# Patient Record
Sex: FEMALE | Race: WHITE | NOT HISPANIC OR LATINO | Employment: UNEMPLOYED | URBAN - METROPOLITAN AREA
[De-identification: names, ages, dates, MRNs, and addresses within clinical notes are randomized per-mention and may not be internally consistent; named-entity substitution may affect disease eponyms.]

---

## 2019-09-07 ENCOUNTER — HOSPITAL ENCOUNTER (OUTPATIENT)
Dept: MEDSURG UNIT | Facility: HOSPITAL | Age: 49
End: 2019-09-09
Attending: INTERNAL MEDICINE | Admitting: INTERNAL MEDICINE

## 2019-09-07 LAB
ABS NEUT (OLG): 5.02 X10(3)/MCL (ref 2.1–9.2)
ANION GAP SERPL CALC-SCNC: 15 MMOL/L
BASOPHILS # BLD AUTO: 0.1 X10(3)/MCL (ref 0–0.2)
BASOPHILS NFR BLD AUTO: 1 %
BUN SERPL-MCNC: 9 MG/DL (ref 8–26)
CHLORIDE SERPL-SCNC: 102 MMOL/L (ref 98–109)
CK MB SERPL-MCNC: <0.5 NG/ML (ref 0.5–3.6)
CK MB SERPL-MCNC: <1 NG/ML (ref 0.5–3.6)
CK SERPL-CCNC: 86 UNIT/L (ref 26–192)
CK SERPL-CCNC: 93 UNIT/L (ref 26–192)
CREAT SERPL-MCNC: 0.9 MG/DL (ref 0.6–1.3)
EOSINOPHIL # BLD AUTO: 0.2 X10(3)/MCL (ref 0–0.9)
EOSINOPHIL NFR BLD AUTO: 2 %
ERYTHROCYTE [DISTWIDTH] IN BLOOD BY AUTOMATED COUNT: 11.9 % (ref 11.5–17)
GLUCOSE SERPL-MCNC: 126 MG/DL (ref 70–105)
HCT VFR BLD AUTO: 43.9 % (ref 37–47)
HCT VFR BLD CALC: 43 % (ref 38–51)
HGB BLD-MCNC: 14.6 MG/DL (ref 12–17)
HGB BLD-MCNC: 14.9 GM/DL (ref 12–16)
LYMPHOCYTES # BLD AUTO: 2.8 X10(3)/MCL (ref 0.6–4.6)
LYMPHOCYTES NFR BLD AUTO: 32 %
MCH RBC QN AUTO: 30.2 PG (ref 27–31)
MCHC RBC AUTO-ENTMCNC: 33.9 GM/DL (ref 33–36)
MCV RBC AUTO: 89 FL (ref 80–94)
MONOCYTES # BLD AUTO: 0.7 X10(3)/MCL (ref 0.1–1.3)
MONOCYTES NFR BLD AUTO: 8 %
NEUTROPHILS # BLD AUTO: 5.02 X10(3)/MCL (ref 2.1–9.2)
NEUTROPHILS NFR BLD AUTO: 57 %
PLATELET # BLD AUTO: 248 X10(3)/MCL (ref 130–400)
PMV BLD AUTO: 11.2 FL (ref 9.4–12.4)
POC IONIZED CALCIUM: 1.06 MMOL/L (ref 1.12–1.32)
POC TCO2: 25 MMOL/L (ref 24–29)
POC TROPONIN: 0 NG/ML (ref 0–0.08)
POTASSIUM BLD-SCNC: 3.5 MMOL/L (ref 3.5–4.9)
RBC # BLD AUTO: 4.93 X10(6)/MCL (ref 4.2–5.4)
SODIUM BLD-SCNC: 138 MMOL/L (ref 138–146)
TROPONIN I SERPL-MCNC: 0.03 NG/ML (ref 0.02–0.49)
TROPONIN I SERPL-MCNC: <0.02 NG/ML (ref 0.02–0.49)
TROPONIN I SERPL-MCNC: <0.02 NG/ML (ref 0.02–0.49)
WBC # SPEC AUTO: 8.8 X10(3)/MCL (ref 4.5–11.5)

## 2019-09-08 LAB
ABS NEUT (OLG): 5.23 X10(3)/MCL (ref 2.1–9.2)
ALBUMIN SERPL-MCNC: 3.9 GM/DL (ref 3.4–5)
ALBUMIN/GLOB SERPL: 1.5 {RATIO}
ALP SERPL-CCNC: 74 UNIT/L (ref 38–126)
ALT SERPL-CCNC: 32 UNIT/L (ref 12–78)
AST SERPL-CCNC: 21 UNIT/L (ref 15–37)
BASOPHILS # BLD AUTO: 0 X10(3)/MCL (ref 0–0.2)
BASOPHILS NFR BLD AUTO: 1 %
BILIRUB SERPL-MCNC: 0.5 MG/DL (ref 0.2–1)
BILIRUBIN DIRECT+TOT PNL SERPL-MCNC: 0.1 MG/DL (ref 0–0.2)
BILIRUBIN DIRECT+TOT PNL SERPL-MCNC: 0.4 MG/DL (ref 0–0.8)
BUN SERPL-MCNC: 9 MG/DL (ref 7–18)
CALCIUM SERPL-MCNC: 8.9 MG/DL (ref 8.5–10.1)
CHLORIDE SERPL-SCNC: 105 MMOL/L (ref 98–107)
CHOLEST SERPL-MCNC: 159 MG/DL (ref 0–200)
CHOLEST/HDLC SERPL: 3.5 {RATIO} (ref 0–4)
CO2 SERPL-SCNC: 28 MMOL/L (ref 21–32)
CREAT SERPL-MCNC: 0.87 MG/DL (ref 0.55–1.02)
EOSINOPHIL # BLD AUTO: 0.1 X10(3)/MCL (ref 0–0.9)
EOSINOPHIL NFR BLD AUTO: 1 %
ERYTHROCYTE [DISTWIDTH] IN BLOOD BY AUTOMATED COUNT: 11.9 % (ref 11.5–17)
EST. AVERAGE GLUCOSE BLD GHB EST-MCNC: 126 MG/DL
GLOBULIN SER-MCNC: 2.6 GM/DL (ref 2.4–3.5)
GLUCOSE SERPL-MCNC: 98 MG/DL (ref 74–106)
HBA1C MFR BLD: 6 % (ref 4.2–6.3)
HCT VFR BLD AUTO: 39.3 % (ref 37–47)
HDLC SERPL-MCNC: 45 MG/DL (ref 35–60)
HGB BLD-MCNC: 13.2 GM/DL (ref 12–16)
LDLC SERPL CALC-MCNC: 88 MG/DL (ref 0–129)
LYMPHOCYTES # BLD AUTO: 1.9 X10(3)/MCL (ref 0.6–4.6)
LYMPHOCYTES NFR BLD AUTO: 24 %
MAGNESIUM SERPL-MCNC: 2 MG/DL (ref 1.8–2.4)
MCH RBC QN AUTO: 30.6 PG (ref 27–31)
MCHC RBC AUTO-ENTMCNC: 33.6 GM/DL (ref 33–36)
MCV RBC AUTO: 91 FL (ref 80–94)
MONOCYTES # BLD AUTO: 0.6 X10(3)/MCL (ref 0.1–1.3)
MONOCYTES NFR BLD AUTO: 8 %
NEUTROPHILS # BLD AUTO: 5.23 X10(3)/MCL (ref 2.1–9.2)
NEUTROPHILS NFR BLD AUTO: 66 %
PLATELET # BLD AUTO: 217 X10(3)/MCL (ref 130–400)
PMV BLD AUTO: 11.2 FL (ref 9.4–12.4)
POTASSIUM SERPL-SCNC: 3.7 MMOL/L (ref 3.5–5.1)
PROT SERPL-MCNC: 6.5 GM/DL (ref 6.4–8.2)
RBC # BLD AUTO: 4.32 X10(6)/MCL (ref 4.2–5.4)
SODIUM SERPL-SCNC: 140 MMOL/L (ref 136–145)
TRIGL SERPL-MCNC: 131 MG/DL (ref 30–150)
TROPONIN I SERPL-MCNC: <0.02 NG/ML (ref 0.02–0.49)
VLDLC SERPL CALC-MCNC: 26 MG/DL
WBC # SPEC AUTO: 7.9 X10(3)/MCL (ref 4.5–11.5)

## 2019-09-09 LAB
ABS NEUT (OLG): 4.79 X10(3)/MCL (ref 2.1–9.2)
ALBUMIN SERPL-MCNC: 4 GM/DL (ref 3.4–5)
ALBUMIN/GLOB SERPL: 1.3 RATIO (ref 1.1–2)
ALP SERPL-CCNC: 77 UNIT/L (ref 38–126)
ALT SERPL-CCNC: 27 UNIT/L (ref 12–78)
AST SERPL-CCNC: 20 UNIT/L (ref 15–37)
BASOPHILS # BLD AUTO: 0.1 X10(3)/MCL (ref 0–0.2)
BASOPHILS NFR BLD AUTO: 1 %
BILIRUB SERPL-MCNC: 0.5 MG/DL (ref 0.2–1)
BILIRUBIN DIRECT+TOT PNL SERPL-MCNC: 0.1 MG/DL (ref 0–0.5)
BILIRUBIN DIRECT+TOT PNL SERPL-MCNC: 0.4 MG/DL (ref 0–0.8)
BUN SERPL-MCNC: 7 MG/DL (ref 7–18)
CALCIUM SERPL-MCNC: 9.5 MG/DL (ref 8.5–10.1)
CHLORIDE SERPL-SCNC: 104 MMOL/L (ref 98–107)
CO2 SERPL-SCNC: 26 MMOL/L (ref 21–32)
CREAT SERPL-MCNC: 0.74 MG/DL (ref 0.55–1.02)
EOSINOPHIL # BLD AUTO: 0.2 X10(3)/MCL (ref 0–0.9)
EOSINOPHIL NFR BLD AUTO: 2 %
ERYTHROCYTE [DISTWIDTH] IN BLOOD BY AUTOMATED COUNT: 12 % (ref 11.5–17)
GLOBULIN SER-MCNC: 3 GM/DL (ref 2.4–3.5)
GLUCOSE SERPL-MCNC: 107 MG/DL (ref 74–106)
HCT VFR BLD AUTO: 41.7 % (ref 37–47)
HGB BLD-MCNC: 14 GM/DL (ref 12–16)
LYMPHOCYTES # BLD AUTO: 2.5 X10(3)/MCL (ref 0.6–4.6)
LYMPHOCYTES NFR BLD AUTO: 31 %
MAGNESIUM SERPL-MCNC: 2.1 MG/DL (ref 1.8–2.4)
MCH RBC QN AUTO: 30 PG (ref 27–31)
MCHC RBC AUTO-ENTMCNC: 33.6 GM/DL (ref 33–36)
MCV RBC AUTO: 89.3 FL (ref 80–94)
MONOCYTES # BLD AUTO: 0.6 X10(3)/MCL (ref 0.1–1.3)
MONOCYTES NFR BLD AUTO: 8 %
NEUTROPHILS # BLD AUTO: 4.79 X10(3)/MCL (ref 2.1–9.2)
NEUTROPHILS NFR BLD AUTO: 58 %
PLATELET # BLD AUTO: 227 X10(3)/MCL (ref 130–400)
PMV BLD AUTO: 11.6 FL (ref 9.4–12.4)
POTASSIUM SERPL-SCNC: 3.4 MMOL/L (ref 3.5–5.1)
PROT SERPL-MCNC: 7 GM/DL (ref 6.4–8.2)
RBC # BLD AUTO: 4.67 X10(6)/MCL (ref 4.2–5.4)
SODIUM SERPL-SCNC: 142 MMOL/L (ref 136–145)
WBC # SPEC AUTO: 8.2 X10(3)/MCL (ref 4.5–11.5)

## 2020-02-05 RX ORDER — NITROFURANTOIN 25; 75 MG/1; MG/1
CAPSULE ORAL
Qty: 30 CAPSULE | Refills: 6 | OUTPATIENT
Start: 2020-02-05

## 2022-03-08 ENCOUNTER — HISTORICAL (OUTPATIENT)
Dept: ADMINISTRATIVE | Facility: HOSPITAL | Age: 52
End: 2022-03-08

## 2022-03-08 LAB
FT4I SERPL CALC-MCNC: 2.44 (ref 2.6–3.6)
T3RU NFR SERPL: 33.16 % (ref 31–39)
T4 SERPL-MCNC: 7.36 UG/DL (ref 4.87–11.72)
TSH SERPL-ACNC: 2.39 M[IU]/L (ref 0.35–4.94)

## 2022-04-10 ENCOUNTER — HISTORICAL (OUTPATIENT)
Dept: ADMINISTRATIVE | Facility: HOSPITAL | Age: 52
End: 2022-04-10
Payer: MEDICAID

## 2022-04-26 VITALS
HEIGHT: 62 IN | SYSTOLIC BLOOD PRESSURE: 110 MMHG | WEIGHT: 214.31 LBS | BODY MASS INDEX: 39.44 KG/M2 | DIASTOLIC BLOOD PRESSURE: 82 MMHG

## 2022-04-30 NOTE — ED PROVIDER NOTES
Patient:   Sugar Johns            MRN: [id number]            FIN: [id number]               Age:   49 years     Sex:  Female     :  1970   Associated Diagnoses:   Chest pain   Author:   Gucci Baldwin MD      Basic Information   Additional information: Chief Complaint from Nursing Triage Note : Chief Complaint   2019 6:48 CDT        Chief Complaint           C/O constant midline chest heaviness w/ intermittent sharp midline chest pain, worse w/ deep breaths that woke her from sleep. Hx HTN, HLD, and anxiety. Also reports SOB, weakness, and dizziness. Denies N/V.  .      History of Present Illness   The patient presents with Patient is a 49-year-old who reports that she was awoken from sleep by substernal chest pain that was associated with nausea and diaphoresis as well as dizziness. Patient reports she has had these intermittent chest pain over the last several months but never with this severity. .  The onset was just prior to arrival.  The course/duration of symptoms is fluctuating in intensity.  Location: substernal. The character of symptoms is heaviness.  The degree at onset was moderate.  The degree at maximum was severe.  The degree at present is moderate.  The exacerbating factor is breathing.  The relieving factor is none.  Risk factors consist of hypertension and HL.  Associated symptoms: diaphoresis, palpitations and Dizziness.        Review of Systems   Constitutional symptoms:  No fever, no chills, no sweats, no weakness, no fatigue.    Skin symptoms:  No rash, no lesion.    Respiratory symptoms:  No shortness of breath, no cough, no hemoptysis.    Cardiovascular symptoms:  see above.   Gastrointestinal symptoms:  No abdominal pain, no nausea, no vomiting, no diarrhea.    Genitourinary symptoms:  No dysuria, no hematuria, no vaginal bleeding, no vaginal discharge.    Musculoskeletal symptoms:  No back pain,    Neurologic symptoms:  No headache, no dizziness, no  altered level of consciousness, no numbness, no tingling, no weakness.              Additional review of systems information: All other systems reviewed and otherwise negative.      Health Status   Allergies:    Allergic Reactions (Selected)  No Known Allergies.   Medications:  (Selected)   Inpatient Medications  Ordered  nitroglycerin 0.4 mg sublingual TAB: 0.4 mg, form: Tab-SL, SL, q5min PRN for chest pain, first dose 09/07/19 7:22:00 CDT, STAT  Prescriptions  Prescribed  propranolol 40 mg oral tablet: See Instructions, TAKE 1.5 TAB(S) BY MOUTH TWICE A DAY, # 270 tab(s), 3 Refill(s), eRx: [facility]/pharmacy #[id number]  tiZANidine 2 mg oral tablet: 2 mg = 1 tab(s), Oral, q8hr, PRN PRN as needed for muscle spasm, # 45 tab(s), 5 Refill(s), Pharmacy: [facility]/pharmacy #[id number]  Documented Medications  Documented  Crestor 20 mg oral tablet: 0 Refill(s)  Trintellix 20 mg oral tablet: 20 mg = 1 tab(s), Oral, Daily, # 30 tab(s), 0 Refill(s)  Xanax XR 1 mg oral tablet, extended release: See Instructions, 1 tab(s) Oral qAM as needed, 0 Refill(s)  aspirin 325 mg oral tablet: Daily, 0 Refill(s).      Past Medical/ Family/ Social History   Medical history:    No active or resolved past medical history items have been selected or recorded., HTN, Hyperlipidemia.   Surgical history:    No active procedure history items have been selected or recorded..   Family history:    No family history items have been selected or recorded., Family , h/o CAD.   Social history:    Social & Psychosocial Habits    Tobacco  09/07/2019  Use: Never (less than 100 in l    Patient Wants Consult For Cessation Counseling N/A    Abuse/Neglect  09/07/2019  SHX Any signs of abuse or neglect No  , Alcohol use: no history of alcohol abuse, Tobacco use: Denies, Drug use: Denies.   Problem list:    Active Problems (2)  Chronic pain   Obesity   .      Physical Examination               Vital Signs   Vital Signs   9/7/2019 7:15 CDT        Peripheral Pulse Rate     92 bpm                              Heart Rate Monitored      91 bpm                             Respiratory Rate          14 br/min                             SpO2                      96 %                             Oxygen Therapy            Room air                             Systolic Blood Pressure   137 mmHg                             Diastolic Blood Pressure  92 mmHg  HI                             Mean Arterial Pressure, Cuff              107 mmHg    9/7/2019 6:48 CDT        Temperature Oral          36.3 DegC                             Temperature Oral (calculated)             97.34 DegF                             Peripheral Pulse Rate     96 bpm                             Respiratory Rate          22 br/min                             SpO2                      99 %                             Oxygen Therapy            Room air                             Systolic Blood Pressure   158 mmHg  HI                             Diastolic Blood Pressure  106 mmHg  HI  .      Vital Signs (last 24 hrs)_____  Last Charted___________  Temp Oral     36.3 DegC  (SEP 07 06:48)  Heart Rate Peripheral   92 bpm  (SEP 07 07:15)  Resp Rate         14 br/min  (SEP 07 07:15)  SBP      137 mmHg  (SEP 07 07:15)  DBP      H 92mmHg  (SEP 07 07:15)  SpO2      96 %  (SEP 07 07:15)  .   Measurements   9/7/2019 6:48 CDT        Weight Dosing             88.5 kg                             Weight Measured and Calculated in Lbs     195.11 lb                             Weight Estimated          88.5 kg                             Height/Length Dosing      157.48 cm                             Height/Length Estimated   157.48 cm                             Body Mass Index Estimated 35.69 kg/m2  .               Per nurse's notes.   Basic Oxygen Information   9/7/2019 7:15 CDT        SpO2                      96 %                             Oxygen Therapy            Room air    9/7/2019 6:48 CDT        SpO2                      99 %                              Oxygen Therapy            Room air  .   General:  No acute distress.   Skin:  Warm, dry, no rash.    Eye:  Pupils are equal, round and reactive to light, normal conjunctiva.    Neck:  Supple.   Cardiovascular:  Regular rate and rhythm, No murmur, Normal peripheral perfusion, No edema.    Respiratory:  Breath sounds are equal, Symmetrical chest wall expansion, Respirations: Regular, Breath sounds: Clear.    Chest wall:  No tenderness, No deformity.    Back:  Nontender, Normal range of motion.    Musculoskeletal:  No swelling, no deformity.    Gastrointestinal:  Soft, Nontender, Non distended, Normal bowel sounds.    Neurological:  Alert and oriented to person, place, time, and situation, No focal neurological deficit observed, CN II-XII intact, normal sensory observed, normal motor observed, normal speech observed, normal coordination observed.    Psychiatric:  Cooperative, appropriate mood & affect.       Medical Decision Making   Differential Diagnosis:  Unstable angina, angina, anxiety, pulmonary embolism, atypical chest pain, pneumonia, gastroesophageal reflux disease, costochondritis, pleurisy, chest wall pain, bronchitis.    Electrocardiogram:  Rate 97, normal sinus rhythm, normal MI & QRS intervals, EP Interp, normal axis, diffuse Twave flattening, No ST changes.    Cardiac monitor:  Normal sinus rhythm.   Results review:  Reviewed Results: Lab results : Lab View(Date Range: 9/6/2019 0:00 CDT - 9/7/2019 9:05 CDT), Lab results : Lab View   9/7/2019 7:20 CDT        POC Sodium                138 mmol/L                             POC Potassium             3.5 mmol/L                             POC Chloride              102 mmol/L                             POC Ion Calcium           1.06 mmol/L  LOW                             POC Glucose               126 mg/dL  HI                             POC BUN                   9.0 mg/dL                             POC Creatinine            0.9 mg/dL                              POC AGAP                  15.0  NA                             POC Hb                    14.6 mg/dL                             POC Hct                   43.0 %                             POC TCO2                  25.0 mmol/L    9/7/2019 7:17 CDT        POC Troponin              0.00 ng/mL    9/7/2019 7:13 CDT        Troponin-I                <0.02 ng/mL                             WBC                       8.8 x10(3)/mcL                             RBC                       4.93 x10(6)/mcL                             Hgb                       14.9 gm/dL                             Hct                       43.9 %                             Platelet                  248 x10(3)/mcL                             MCV                       89.0 fL                             MCH                       30.2 pg                             MCHC                      33.9 gm/dL                             RDW                       11.9 %                             MPV                       11.2 fL                             Abs Neut                  5.02 x10(3)/mcL                             Neutro Auto               57 %  NA                             Lymph Auto                32 %  NA                             Mono Auto                 8 %  NA                             Eos Auto                  2 %  NA                             Abs Eos                   0.2 x10(3)/mcL                             Basophil Auto             1 %  NA                             Abs Neutro                5.02 x10(3)/mcL                             Abs Lymph                 2.8 x10(3)/mcL                             Abs Mono                  0.7 x10(3)/mcL                             Abs Baso                  0.1 x10(3)/mcL  .    Radiology results:  Rad Results (ST)  < 12 hrs   Accession: [id number]  Order: XR Chest 1 View  Report Dt/Tm: 09/07/2019 08:18  Report:   PORTABLE CHEST X-RAY, ONE FRONTAL VIEW     HISTORY: Chest  Pain     COMPARISON: None.     FINDINGS:       No focal consolidations, pleural effusions or pneumothoraces.  Cardiomediastinal silhouette within normal limits.  No acute bony pathology.  Soft tissues within normal limits.       IMPRESSION:     No acute thoracic abnormality.           .      Reexamination/ Reevaluation   Vital signs   results included from flowsheet : Vital Signs   9/7/2019 7:38 CDT        Systolic Blood Pressure   125 mmHg                             Diastolic Blood Pressure  93 mmHg  HI    9/7/2019 7:35 CDT        Peripheral Pulse Rate     96 bpm                             Heart Rate Monitored      97 bpm                             Respiratory Rate          17 br/min                             SpO2                      95 %                             Systolic Blood Pressure   139 mmHg                             Diastolic Blood Pressure  97 mmHg  HI                             Mean Arterial Pressure, Cuff              111 mmHg    9/7/2019 7:33 CDT        Systolic Blood Pressure   139 mmHg                             Diastolic Blood Pressure  97 mmHg  HI    9/7/2019 7:28 CDT        Systolic Blood Pressure   137 mmHg                             Diastolic Blood Pressure  92 mmHg  HI    9/7/2019 7:15 CDT        Peripheral Pulse Rate     92 bpm                             Heart Rate Monitored      91 bpm                             Respiratory Rate          14 br/min                             SpO2                      96 %                             Oxygen Therapy            Room air                             Systolic Blood Pressure   137 mmHg                             Diastolic Blood Pressure  92 mmHg  HI                             Mean Arterial Pressure, Cuff              107 mmHg    9/7/2019 6:48 CDT        Temperature Oral          36.3 DegC                             Temperature Oral (calculated)             97.34 DegF                             Peripheral Pulse Rate     96 bpm                              Respiratory Rate          22 br/min                             SpO2                      99 %                             Oxygen Therapy            Room air                             Systolic Blood Pressure   158 mmHg  HI                             Diastolic Blood Pressure  106 mmHg  HI     Basic Oxygen Information   9/7/2019 7:15 CDT        SpO2                      96 %                             Oxygen Therapy            Room air    9/7/2019 6:48 CDT        SpO2                      99 %                             Oxygen Therapy            Room air     Notes:   Patient seen and examined. Treated with sublingual nitroglycerin with significant relief.   Labs and imaging reviewed as above. No acute lab abnormalities. CXR negative. Case was discussed with [facility] for further evaluation.  On re-evaluation, patient reported after SL nitroglycerin that she was having a bit of reflux, and was feeling anxious and having a headache.   Patient treated with xanax which she has been taking for past 30 years per report as well as, pepcid and tylenol. Patient admitted to telemetry.  Patient remained stable during ED evaluation.  .      Impression and Plan   Diagnosis   Chest pain ([id number] R07.9)   Plan   Condition: Stable.    Disposition: Micaela CHRISTY, Brady HERRERA    Notes: No scribe was involved in the documentation of this chart..

## 2022-06-21 ENCOUNTER — HOSPITAL ENCOUNTER (EMERGENCY)
Facility: HOSPITAL | Age: 52
Discharge: LEFT BEFORE DISCHARGE | End: 2022-06-21
Payer: MEDICAID

## 2022-06-21 VITALS
HEIGHT: 62 IN | SYSTOLIC BLOOD PRESSURE: 136 MMHG | DIASTOLIC BLOOD PRESSURE: 68 MMHG | OXYGEN SATURATION: 97 % | HEART RATE: 80 BPM | BODY MASS INDEX: 46.01 KG/M2 | WEIGHT: 250 LBS | RESPIRATION RATE: 16 BRPM | TEMPERATURE: 98 F

## 2022-06-21 DIAGNOSIS — R53.1 WEAKNESS: ICD-10-CM

## 2022-06-21 LAB
FLUAV AG UPPER RESP QL IA.RAPID: NOT DETECTED
FLUBV AG UPPER RESP QL IA.RAPID: NOT DETECTED
SARS-COV-2 RNA RESP QL NAA+PROBE: NOT DETECTED

## 2022-06-21 PROCEDURE — 99900041 HC LEFT WITHOUT BEING SEEN- EMERGENCY

## 2022-06-21 PROCEDURE — 93005 ELECTROCARDIOGRAM TRACING: CPT

## 2022-06-21 PROCEDURE — 87636 SARSCOV2 & INF A&B AMP PRB: CPT | Performed by: EMERGENCY MEDICINE

## 2022-07-05 ENCOUNTER — HOSPITAL ENCOUNTER (EMERGENCY)
Facility: HOSPITAL | Age: 52
Discharge: HOME | End: 2022-07-05
Attending: STUDENT IN AN ORGANIZED HEALTH CARE EDUCATION/TRAINING PROGRAM
Payer: COMMERCIAL

## 2022-07-05 VITALS
HEIGHT: 62 IN | WEIGHT: 234 LBS | BODY MASS INDEX: 43.06 KG/M2 | SYSTOLIC BLOOD PRESSURE: 115 MMHG | HEART RATE: 103 BPM | RESPIRATION RATE: 18 BRPM | TEMPERATURE: 98 F | OXYGEN SATURATION: 98 % | DIASTOLIC BLOOD PRESSURE: 85 MMHG

## 2022-07-05 DIAGNOSIS — K21.9 GASTROESOPHAGEAL REFLUX DISEASE, UNSPECIFIED WHETHER ESOPHAGITIS PRESENT: Primary | ICD-10-CM

## 2022-07-05 DIAGNOSIS — F41.9 ANXIETY: ICD-10-CM

## 2022-07-05 DIAGNOSIS — R07.9 CHEST PAIN: ICD-10-CM

## 2022-07-05 LAB
ALBUMIN SERPL-MCNC: 4.5 GM/DL (ref 3.5–5)
ALBUMIN/GLOB SERPL: 1.5 RATIO (ref 1.1–2)
ALP SERPL-CCNC: 68 UNIT/L (ref 40–150)
ALT SERPL-CCNC: 18 UNIT/L (ref 0–55)
AST SERPL-CCNC: 22 UNIT/L (ref 5–34)
BASOPHILS # BLD AUTO: 0.05 X10(3)/MCL (ref 0–0.2)
BASOPHILS NFR BLD AUTO: 0.6 %
BILIRUBIN DIRECT+TOT PNL SERPL-MCNC: 0.6 MG/DL
BUN SERPL-MCNC: 6.7 MG/DL (ref 9.8–20.1)
CALCIUM SERPL-MCNC: 10.1 MG/DL (ref 8.4–10.2)
CHLORIDE SERPL-SCNC: 88 MMOL/L (ref 98–107)
CO2 SERPL-SCNC: 27 MMOL/L (ref 22–29)
CREAT SERPL-MCNC: 0.74 MG/DL (ref 0.55–1.02)
EOSINOPHIL # BLD AUTO: 0.1 X10(3)/MCL (ref 0–0.9)
EOSINOPHIL NFR BLD AUTO: 1.3 %
ERYTHROCYTE [DISTWIDTH] IN BLOOD BY AUTOMATED COUNT: 13 % (ref 11.5–17)
GLOBULIN SER-MCNC: 3 GM/DL (ref 2.4–3.5)
GLUCOSE SERPL-MCNC: 116 MG/DL (ref 74–100)
HCT VFR BLD AUTO: 37.2 % (ref 37–47)
HGB BLD-MCNC: 13.7 GM/DL (ref 12–16)
IMM GRANULOCYTES # BLD AUTO: 0.02 X10(3)/MCL (ref 0–0.04)
IMM GRANULOCYTES NFR BLD AUTO: 0.3 %
LIPASE SERPL-CCNC: 20 U/L
LYMPHOCYTES # BLD AUTO: 1.72 X10(3)/MCL (ref 0.6–4.6)
LYMPHOCYTES NFR BLD AUTO: 21.6 %
MAGNESIUM SERPL-MCNC: 1.8 MG/DL (ref 1.6–2.6)
MCH RBC QN AUTO: 30.6 PG (ref 27–31)
MCHC RBC AUTO-ENTMCNC: 36.8 MG/DL (ref 33–36)
MCV RBC AUTO: 83.2 FL (ref 80–94)
MONOCYTES # BLD AUTO: 0.71 X10(3)/MCL (ref 0.1–1.3)
MONOCYTES NFR BLD AUTO: 8.9 %
NEUTROPHILS # BLD AUTO: 5.4 X10(3)/MCL (ref 2.1–9.2)
NEUTROPHILS NFR BLD AUTO: 67.3 %
NRBC BLD AUTO-RTO: 0 %
PLATELET # BLD AUTO: 220 X10(3)/MCL (ref 130–400)
PMV BLD AUTO: 10.8 FL (ref 7.4–10.4)
POTASSIUM SERPL-SCNC: 3.8 MMOL/L (ref 3.5–5.1)
PROT SERPL-MCNC: 7.5 GM/DL (ref 6.4–8.3)
RBC # BLD AUTO: 4.47 X10(6)/MCL (ref 4.2–5.4)
SODIUM SERPL-SCNC: 133 MMOL/L (ref 136–145)
TROPONIN I SERPL-MCNC: <0.01 NG/ML (ref 0–0.04)
TSH SERPL-ACNC: 2.2 UIU/ML (ref 0.35–4.94)
WBC # SPEC AUTO: 8 X10(3)/MCL (ref 4.5–11.5)

## 2022-07-05 PROCEDURE — 83690 ASSAY OF LIPASE: CPT | Performed by: STUDENT IN AN ORGANIZED HEALTH CARE EDUCATION/TRAINING PROGRAM

## 2022-07-05 PROCEDURE — 93005 ELECTROCARDIOGRAM TRACING: CPT

## 2022-07-05 PROCEDURE — 36415 COLL VENOUS BLD VENIPUNCTURE: CPT | Performed by: STUDENT IN AN ORGANIZED HEALTH CARE EDUCATION/TRAINING PROGRAM

## 2022-07-05 PROCEDURE — 84443 ASSAY THYROID STIM HORMONE: CPT | Performed by: STUDENT IN AN ORGANIZED HEALTH CARE EDUCATION/TRAINING PROGRAM

## 2022-07-05 PROCEDURE — 85025 COMPLETE CBC W/AUTO DIFF WBC: CPT | Performed by: STUDENT IN AN ORGANIZED HEALTH CARE EDUCATION/TRAINING PROGRAM

## 2022-07-05 PROCEDURE — 93010 EKG 12-LEAD: ICD-10-PCS | Mod: ,,, | Performed by: INTERNAL MEDICINE

## 2022-07-05 PROCEDURE — 84484 ASSAY OF TROPONIN QUANT: CPT | Performed by: STUDENT IN AN ORGANIZED HEALTH CARE EDUCATION/TRAINING PROGRAM

## 2022-07-05 PROCEDURE — 93010 ELECTROCARDIOGRAM REPORT: CPT | Mod: ,,, | Performed by: INTERNAL MEDICINE

## 2022-07-05 PROCEDURE — 83735 ASSAY OF MAGNESIUM: CPT | Performed by: STUDENT IN AN ORGANIZED HEALTH CARE EDUCATION/TRAINING PROGRAM

## 2022-07-05 PROCEDURE — 80053 COMPREHEN METABOLIC PANEL: CPT | Performed by: STUDENT IN AN ORGANIZED HEALTH CARE EDUCATION/TRAINING PROGRAM

## 2022-07-05 PROCEDURE — 25000003 PHARM REV CODE 250: Performed by: STUDENT IN AN ORGANIZED HEALTH CARE EDUCATION/TRAINING PROGRAM

## 2022-07-05 PROCEDURE — 99285 EMERGENCY DEPT VISIT HI MDM: CPT | Mod: 25

## 2022-07-05 RX ORDER — ALUMINUM HYDROXIDE, MAGNESIUM HYDROXIDE, AND SIMETHICONE 2400; 240; 2400 MG/30ML; MG/30ML; MG/30ML
5 SUSPENSION ORAL
Status: COMPLETED | OUTPATIENT
Start: 2022-07-05 | End: 2022-07-05

## 2022-07-05 RX ADMIN — ALUMINUM HYDROXIDE, MAGNESIUM HYDROXIDE, AND DIMETHICONE 5 ML: 400; 400; 40 SUSPENSION ORAL at 11:07

## 2022-07-05 NOTE — ED PROVIDER NOTES
Encounter Date: 7/5/2022       History     Chief Complaint   Patient presents with    General Illness     Pt has multiple unrelated complaints of Sweating, chest pain, fatigue, headaches, slow speech(due to a mouth guard in place and will be having dental procedure next Monday for extraction) hx of anxiety states that she took xanax last night and states it did not help      The history is provided by the patient and the spouse.       52-year-old female with a past medical history of hypertension, anxiety/depression, type 2 diabetes and GERD presents emergency department for a multitude of complaints.  Firstly, patient states that she has been very anxious secondary to an upcoming dental surgery which she states will take 8 hours to complete.  Patient states that her anxiety has been elevated with this upcoming in next few days.  She also states that she has been having some chest pain.  States it is in epigastric region.  Unsure if it is her chest or GERD.  States that has been ongoing for last 2 days.  Fluctuates in intensity.  States is a burning sensation.  She also feels more fatigued than normal and has intermittent headaches.  Patient states that she has been taking less of her Xanax because she does not want to be taking too much Xanax.  No fevers or chills.  No shortness of breath or cough.    Review of patient's allergies indicates:   Allergen Reactions    Hydrocodone     Meperidine      Other reaction(s): Panic attack    Ciprofloxacin Nausea Only    Gabapentin Anxiety    Sulfamethoxazole-trimethoprim Nausea Only     Past Medical History:   Diagnosis Date    Anxiety disorder, unspecified     Depression     Diabetes mellitus     GERD (gastroesophageal reflux disease)     Hypertension      Past Surgical History:   Procedure Laterality Date    HYSTERECTOMY       History reviewed. No pertinent family history.  Social History     Tobacco Use    Smoking status: Former Smoker     Quit date: 2020      Years since quittin.5    Smokeless tobacco: Never Used   Substance Use Topics    Drug use: Never     Review of Systems   Constitutional: Positive for fatigue. Negative for fever.   Respiratory: Negative for cough and shortness of breath.    Cardiovascular: Positive for chest pain. Negative for palpitations.   Gastrointestinal: Positive for abdominal pain (epigastric). Negative for constipation, diarrhea, nausea and vomiting.   Psychiatric/Behavioral: The patient is nervous/anxious.        Physical Exam     Initial Vitals [22 1100]   BP Pulse Resp Temp SpO2   124/89 82 16 97.5 °F (36.4 °C) 99 %      MAP       --         Physical Exam    Nursing note and vitals reviewed.  Constitutional: She appears well-developed and well-nourished. No distress.   Cardiovascular: Normal rate and regular rhythm.   Pulmonary/Chest: Breath sounds normal. No respiratory distress.   Abdominal: Abdomen is soft. Bowel sounds are normal. There is abdominal tenderness (mild epigastric pain-reproduces complaint). There is no rebound and no guarding.   Musculoskeletal:         General: No tenderness. Normal range of motion.     Neurological: She is alert and oriented to person, place, and time.   Skin: Skin is warm. Capillary refill takes less than 2 seconds.   Psychiatric: She has a normal mood and affect. Thought content normal.         ED Course   Procedures  Labs Reviewed   COMPREHENSIVE METABOLIC PANEL - Abnormal; Notable for the following components:       Result Value    Sodium Level 133 (*)     Chloride 88 (*)     Glucose Level 116 (*)     Blood Urea Nitrogen 6.7 (*)     All other components within normal limits   CBC WITH DIFFERENTIAL - Abnormal; Notable for the following components:    MCHC 36.8 (*)     MPV 10.8 (*)     All other components within normal limits   LIPASE - Normal   MAGNESIUM - Normal   TROPONIN I - Normal   TSH - Normal   CBC W/ AUTO DIFFERENTIAL    Narrative:     The following orders were created for panel  order CBC auto differential.  Procedure                               Abnormality         Status                     ---------                               -----------         ------                     CBC with Differential[[id number]]        Abnormal            Final result                 Please view results for these tests on the individual orders.     EKG Readings: (Independently Interpreted)   Rhythm: Normal Sinus Rhythm. Heart Rate: 89. Ectopy: No Ectopy. Conduction: Normal. ST Segments: Normal ST Segments. T Waves: Normal. Clinical Impression: Normal Sinus Rhythm     ECG Results          EKG 12-lead (In process)  Result time 07/05/22 13:11:03    In process by Interface, Lab In [facility] (07/05/22 13:11:03)                 Narrative:    Test Reason : R07.9,    Vent. Rate : 089 BPM     Atrial Rate : 089 BPM     P-R Int : 138 ms          QRS Dur : 094 ms      QT Int : 376 ms       P-R-T Axes : 057 050 -02 degrees     QTc Int : 457 ms    Normal sinus rhythm  Nonspecific T wave abnormality  Abnormal ECG  When compared with ECG of 21-JUN-2022 04:06,  No significant change was found    Referred By: [email or web address]   SELF           Confirmed By:                             Imaging Results          X-Ray Chest AP Portable (Final result)  Result time 07/05/22 11:45:57    Final result by Eleazar Spears MD (07/05/22 11:45:57)                 Impression:        No convincing acute radiographic abnormality.      Electronically signed by: Eleazar Spears  Date:    07/05/2022  Time:    11:45             Narrative:    EXAMINATION:  XR CHEST AP PORTABLE    CLINICAL HISTORY:  ; chest pain;    TECHNIQUE:  Single view (AP) of the chest.    COMPARISON:  7 September 2019    FINDINGS:  Lines/tubes/devices: none present    The cardiomediastinal silhouette and central pulmonary vasculature are unremarkable for utilized technique.  The trachea is midline. There is no lobar consolidation, pleural effusion, or pneumothorax.  Rounded opacity at  the left costophrenic angle is unchanged, consistent with benign etiology.    There is no acute osseous or extrathoracic abnormality.                                X-Rays:   Independently Interpreted Readings:   Other Readings:  No consolidations, pneumothorax subcutaneous emphysema    Medications   aluminum & magnesium hydroxide-simethicone 400-400-40 mg/5 mL suspension 5 mL (5 mLs Oral Given 7/5/22 1148)     Medical Decision Making:   Differential Diagnosis:   Anxiety, ACS, GERD, pneumothorax             ED Course as of 07/05/22 1330   Tue Jul 05, 2022   1327 Patient resting comfortably in bed no acute distress.  Vital signs are stable.  Patient states he feels better after Maalox.  No longer having any chest pain epigastric pain.  Informed patient to follow-up PCP to restart further anti acid medications.  Informed to take her Nexium as prescribed.  Strict ER precautions given and patient states understanding. [BS]      ED Course User Index  [BS] Jd Holt MD             Clinical Impression:   Final diagnoses:  [R07.9] Chest pain  [K21.9] Gastroesophageal reflux disease, unspecified whether esophagitis present (Primary)  [F41.9] Anxiety          ED Disposition Condition    Discharge Stable        ED Prescriptions     None        Follow-up Information     Follow up With Specialties Details Why Contact Info    Roberto Araujo MD Internal Medicine Schedule an appointment as soon as possible for a visit   [address] [facility].  [facility] [address]  [phone]      [facility] - Emergency Dept Emergency Medicine Go to  If symptoms worsen [address]  [facility] [id number] [phone]           Jd Holt MD  07/05/22 133

## 2023-05-23 ENCOUNTER — HOSPITAL ENCOUNTER (OUTPATIENT)
Facility: HOSPITAL | Age: 53
Discharge: HOME | End: 2023-05-28
Attending: EMERGENCY MEDICINE | Admitting: INTERNAL MEDICINE
Payer: COMMERCIAL

## 2023-05-23 DIAGNOSIS — R07.9 CHEST PAIN: ICD-10-CM

## 2023-05-23 DIAGNOSIS — R42 POSTURAL DIZZINESS WITH NEAR SYNCOPE: ICD-10-CM

## 2023-05-23 DIAGNOSIS — R42 DIZZINESS: ICD-10-CM

## 2023-05-23 DIAGNOSIS — R55 POSTURAL DIZZINESS WITH NEAR SYNCOPE: ICD-10-CM

## 2023-05-23 DIAGNOSIS — I82.409 DVT (DEEP VENOUS THROMBOSIS): ICD-10-CM

## 2023-05-23 LAB
ALBUMIN SERPL-MCNC: 4 G/DL (ref 3.5–5)
ALBUMIN/GLOB SERPL: 1.3 RATIO (ref 1.1–2)
ALP SERPL-CCNC: 47 UNIT/L (ref 40–150)
ALT SERPL-CCNC: 25 UNIT/L (ref 0–55)
APPEARANCE UR: CLEAR
AST SERPL-CCNC: 24 UNIT/L (ref 5–34)
BACTERIA #/AREA URNS AUTO: ABNORMAL /HPF
BASOPHILS # BLD AUTO: 0.04 X10(3)/MCL
BASOPHILS NFR BLD AUTO: 0.5 %
BILIRUB UR QL STRIP.AUTO: NEGATIVE MG/DL
BILIRUBIN DIRECT+TOT PNL SERPL-MCNC: 0.8 MG/DL
BNP BLD-MCNC: 22.4 PG/ML
BUN SERPL-MCNC: 13.7 MG/DL (ref 9.8–20.1)
CALCIUM SERPL-MCNC: 10 MG/DL (ref 8.4–10.2)
CHLORIDE SERPL-SCNC: 103 MMOL/L (ref 98–107)
CO2 SERPL-SCNC: 21 MMOL/L (ref 22–29)
COLOR UR: YELLOW
CREAT SERPL-MCNC: 0.94 MG/DL (ref 0.55–1.02)
EOSINOPHIL # BLD AUTO: 0.03 X10(3)/MCL (ref 0–0.9)
EOSINOPHIL NFR BLD AUTO: 0.3 %
ERYTHROCYTE [DISTWIDTH] IN BLOOD BY AUTOMATED COUNT: 12 % (ref 11.5–17)
FLUAV AG UPPER RESP QL IA.RAPID: NOT DETECTED
FLUBV AG UPPER RESP QL IA.RAPID: NOT DETECTED
GFR SERPLBLD CREATININE-BSD FMLA CKD-EPI: >60 MLS/MIN/1.73/M2
GLOBULIN SER-MCNC: 3.1 GM/DL (ref 2.4–3.5)
GLUCOSE SERPL-MCNC: 115 MG/DL (ref 74–100)
GLUCOSE UR QL STRIP.AUTO: NEGATIVE MG/DL
HCT VFR BLD AUTO: 38 % (ref 37–47)
HGB BLD-MCNC: 13.2 G/DL (ref 12–16)
IMM GRANULOCYTES # BLD AUTO: 0.03 X10(3)/MCL (ref 0–0.04)
IMM GRANULOCYTES NFR BLD AUTO: 0.3 %
KETONES UR QL STRIP.AUTO: NEGATIVE MG/DL
LEUKOCYTE ESTERASE UR QL STRIP.AUTO: ABNORMAL UNIT/L
LYMPHOCYTES # BLD AUTO: 1.61 X10(3)/MCL (ref 0.6–4.6)
LYMPHOCYTES NFR BLD AUTO: 18.5 %
MAGNESIUM SERPL-MCNC: 1.8 MG/DL (ref 1.6–2.6)
MCH RBC QN AUTO: 31.3 PG (ref 27–31)
MCHC RBC AUTO-ENTMCNC: 34.7 G/DL (ref 33–36)
MCV RBC AUTO: 90 FL (ref 80–94)
MONOCYTES # BLD AUTO: 0.52 X10(3)/MCL (ref 0.1–1.3)
MONOCYTES NFR BLD AUTO: 6 %
NEUTROPHILS # BLD AUTO: 6.47 X10(3)/MCL (ref 2.1–9.2)
NEUTROPHILS NFR BLD AUTO: 74.4 %
NITRITE UR QL STRIP.AUTO: NEGATIVE
NRBC BLD AUTO-RTO: 0 %
PH UR STRIP.AUTO: 5.5 [PH]
PLATELET # BLD AUTO: 227 X10(3)/MCL (ref 130–400)
PMV BLD AUTO: 10.8 FL (ref 7.4–10.4)
POCT GLUCOSE: 93 MG/DL (ref 70–110)
POTASSIUM SERPL-SCNC: 4.3 MMOL/L (ref 3.5–5.1)
PROT SERPL-MCNC: 7.1 GM/DL (ref 6.4–8.3)
PROT UR QL STRIP.AUTO: NEGATIVE MG/DL
RBC # BLD AUTO: 4.22 X10(6)/MCL (ref 4.2–5.4)
RBC #/AREA URNS AUTO: ABNORMAL /HPF
RBC UR QL AUTO: NEGATIVE UNIT/L
SARS-COV-2 RNA RESP QL NAA+PROBE: NOT DETECTED
SODIUM SERPL-SCNC: 136 MMOL/L (ref 136–145)
SP GR UR STRIP.AUTO: 1.01
SQUAMOUS #/AREA URNS AUTO: ABNORMAL /HPF
TROPONIN I SERPL-MCNC: <0.01 NG/ML (ref 0–0.04)
TSH SERPL-ACNC: 2.43 UIU/ML (ref 0.35–4.94)
UROBILINOGEN UR STRIP-ACNC: 0.2 MG/DL
WBC # SPEC AUTO: 8.7 X10(3)/MCL (ref 4.5–11.5)
WBC #/AREA URNS AUTO: ABNORMAL /HPF

## 2023-05-23 PROCEDURE — G0378 HOSPITAL OBSERVATION PER HR: HCPCS

## 2023-05-23 PROCEDURE — 83880 ASSAY OF NATRIURETIC PEPTIDE: CPT | Performed by: EMERGENCY MEDICINE

## 2023-05-23 PROCEDURE — 25000003 PHARM REV CODE 250: Performed by: EMERGENCY MEDICINE

## 2023-05-23 PROCEDURE — 83735 ASSAY OF MAGNESIUM: CPT | Performed by: EMERGENCY MEDICINE

## 2023-05-23 PROCEDURE — 93005 ELECTROCARDIOGRAM TRACING: CPT

## 2023-05-23 PROCEDURE — 84484 ASSAY OF TROPONIN QUANT: CPT | Performed by: EMERGENCY MEDICINE

## 2023-05-23 PROCEDURE — 84443 ASSAY THYROID STIM HORMONE: CPT | Performed by: EMERGENCY MEDICINE

## 2023-05-23 PROCEDURE — 99285 EMERGENCY DEPT VISIT HI MDM: CPT | Mod: 25

## 2023-05-23 PROCEDURE — 85025 COMPLETE CBC W/AUTO DIFF WBC: CPT | Performed by: EMERGENCY MEDICINE

## 2023-05-23 PROCEDURE — 96375 TX/PRO/DX INJ NEW DRUG ADDON: CPT

## 2023-05-23 PROCEDURE — 21400001 HC TELEMETRY ROOM

## 2023-05-23 PROCEDURE — 80053 COMPREHEN METABOLIC PANEL: CPT | Performed by: EMERGENCY MEDICINE

## 2023-05-23 PROCEDURE — 0240U COVID/FLU A&B PCR: CPT | Performed by: EMERGENCY MEDICINE

## 2023-05-23 PROCEDURE — 81001 URINALYSIS AUTO W/SCOPE: CPT | Performed by: EMERGENCY MEDICINE

## 2023-05-23 PROCEDURE — 63600175 PHARM REV CODE 636 W HCPCS: Performed by: EMERGENCY MEDICINE

## 2023-05-23 PROCEDURE — 82962 GLUCOSE BLOOD TEST: CPT

## 2023-05-23 PROCEDURE — 25500020 PHARM REV CODE 255: Performed by: EMERGENCY MEDICINE

## 2023-05-23 PROCEDURE — 96361 HYDRATE IV INFUSION ADD-ON: CPT

## 2023-05-23 RX ORDER — SODIUM CHLORIDE 9 MG/ML
1000 INJECTION, SOLUTION INTRAVENOUS
Status: COMPLETED | OUTPATIENT
Start: 2023-05-23 | End: 2023-05-23

## 2023-05-23 RX ORDER — AMLODIPINE BESYLATE 2.5 MG/1
2.5 TABLET ORAL DAILY
Status: ON HOLD | COMMUNITY
Start: 2023-04-20 | End: 2023-05-28 | Stop reason: SDUPTHER

## 2023-05-23 RX ORDER — ACETAMINOPHEN 500 MG
1000 TABLET ORAL
Status: COMPLETED | OUTPATIENT
Start: 2023-05-23 | End: 2023-05-23

## 2023-05-23 RX ORDER — PROPRANOLOL HYDROCHLORIDE 60 MG/1
60 TABLET ORAL 2 TIMES DAILY
COMMUNITY
Start: 2023-02-22 | End: 2023-05-28

## 2023-05-23 RX ORDER — ACETAMINOPHEN 500 MG
TABLET ORAL
Status: DISPENSED
Start: 2023-05-23 | End: 2023-05-24

## 2023-05-23 RX ORDER — OXYCODONE HYDROCHLORIDE 5 MG/1
TABLET ORAL
Status: DISPENSED
Start: 2023-05-23 | End: 2023-05-24

## 2023-05-23 RX ORDER — ALPRAZOLAM 1 MG/1
1 TABLET ORAL 2 TIMES DAILY PRN
COMMUNITY
Start: 2023-05-04

## 2023-05-23 RX ORDER — LORAZEPAM 2 MG/ML
1 INJECTION INTRAMUSCULAR
Status: COMPLETED | OUTPATIENT
Start: 2023-05-23 | End: 2023-05-23

## 2023-05-23 RX ORDER — LEVOTHYROXINE SODIUM 50 UG/1
50 TABLET ORAL DAILY
COMMUNITY
Start: 2023-04-20

## 2023-05-23 RX ORDER — ROSUVASTATIN CALCIUM 20 MG/1
20 TABLET, COATED ORAL DAILY
COMMUNITY
Start: 2023-04-12

## 2023-05-23 RX ORDER — ONDANSETRON 2 MG/ML
4 INJECTION INTRAMUSCULAR; INTRAVENOUS
Status: COMPLETED | OUTPATIENT
Start: 2023-05-23 | End: 2023-05-23

## 2023-05-23 RX ORDER — FLUTICASONE PROPIONATE 50 MCG
1 SPRAY, SUSPENSION (ML) NASAL 2 TIMES DAILY
COMMUNITY
Start: 2023-05-22

## 2023-05-23 RX ORDER — VORTIOXETINE 20 MG/1
1 TABLET, FILM COATED ORAL DAILY
COMMUNITY
Start: 2023-03-07

## 2023-05-23 RX ORDER — OXYCODONE HYDROCHLORIDE 5 MG/1
5 TABLET ORAL
Status: COMPLETED | OUTPATIENT
Start: 2023-05-23 | End: 2023-05-23

## 2023-05-23 RX ORDER — MECLIZINE HYDROCHLORIDE 25 MG/1
TABLET ORAL
Status: DISPENSED
Start: 2023-05-23 | End: 2023-05-24

## 2023-05-23 RX ORDER — ESOMEPRAZOLE MAGNESIUM 40 MG/1
40 CAPSULE, DELAYED RELEASE ORAL DAILY
COMMUNITY
Start: 2023-04-30

## 2023-05-23 RX ORDER — METFORMIN HYDROCHLORIDE 500 MG/1
500 TABLET, EXTENDED RELEASE ORAL DAILY
COMMUNITY
Start: 2023-04-02

## 2023-05-23 RX ORDER — SPIRONOLACTONE 25 MG/1
25 TABLET ORAL DAILY
COMMUNITY
Start: 2023-03-12 | End: 2023-05-28

## 2023-05-23 RX ORDER — HYDROXYZINE HYDROCHLORIDE 50 MG/1
50-100 TABLET, FILM COATED ORAL NIGHTLY PRN
COMMUNITY
Start: 2023-05-04

## 2023-05-23 RX ORDER — OLMESARTAN MEDOXOMIL 40 MG/1
40 TABLET ORAL DAILY
COMMUNITY
Start: 2023-05-14

## 2023-05-23 RX ORDER — MECLIZINE HYDROCHLORIDE 25 MG/1
25 TABLET ORAL
Status: COMPLETED | OUTPATIENT
Start: 2023-05-23 | End: 2023-05-23

## 2023-05-23 RX ADMIN — SODIUM CHLORIDE, POTASSIUM CHLORIDE, SODIUM LACTATE AND CALCIUM CHLORIDE 1000 ML: 600; 310; 30; 20 INJECTION, SOLUTION INTRAVENOUS at 04:05

## 2023-05-23 RX ADMIN — SODIUM CHLORIDE 1000 ML: 9 INJECTION, SOLUTION INTRAVENOUS at 08:05

## 2023-05-23 RX ADMIN — OXYCODONE HYDROCHLORIDE 5 MG: 5 TABLET ORAL at 07:05

## 2023-05-23 RX ADMIN — MECLIZINE HYDROCHLORIDE 25 MG: 25 TABLET ORAL at 07:05

## 2023-05-23 RX ADMIN — SODIUM CHLORIDE 1000 ML: 9 INJECTION, SOLUTION INTRAVENOUS at 09:05

## 2023-05-23 RX ADMIN — LORAZEPAM 1 MG: 2 INJECTION INTRAMUSCULAR; INTRAVENOUS at 04:05

## 2023-05-23 RX ADMIN — ONDANSETRON 4 MG: 2 INJECTION INTRAMUSCULAR; INTRAVENOUS at 04:05

## 2023-05-23 RX ADMIN — ACETAMINOPHEN 1000 MG: 500 TABLET, FILM COATED ORAL at 07:05

## 2023-05-23 RX ADMIN — IOPAMIDOL 100 ML: 755 INJECTION, SOLUTION INTRAVENOUS at 08:05

## 2023-05-23 NOTE — ED PROVIDER NOTES
Encounter Date: 5/23/2023       History     Chief Complaint   Patient presents with    Weakness     Complains of weakness and dizziness     The history is provided by the patient and the EMS personnel. No  was used.   Illness   The current episode started today. The problem has been unchanged. Nothing relieves the symptoms. Nothing aggravates the symptoms. Associated symptoms include muscle aches. Pertinent negatives include no fever, no nausea, no sore throat, no shortness of breath and no rash.   Reports h/o vertigo but that this dizziness is much worse than her typical vertigo.  Unrelieved with meclizine.    Review of patient's allergies indicates:   Allergen Reactions    Hydrocodone     Meperidine      Other reaction(s): Panic attack    Ciprofloxacin Nausea Only    Gabapentin Anxiety    Sulfamethoxazole-trimethoprim Nausea Only     Past Medical History:   Diagnosis Date    Anxiety disorder, unspecified     Depression     Diabetes mellitus     GERD (gastroesophageal reflux disease)     Hypertension      Past Surgical History:   Procedure Laterality Date    HYSTERECTOMY       No family history on file.  Social History     Tobacco Use    Smoking status: Former     Types: Cigarettes     Quit date: 2020     Years since quitting: 3.3    Smokeless tobacco: Never   Substance Use Topics    Drug use: Never     Review of Systems   Constitutional:  Negative for fever.   HENT:  Negative for sore throat.    Respiratory:  Negative for shortness of breath.    Cardiovascular:  Negative for chest pain.   Gastrointestinal:  Negative for nausea.   Genitourinary:  Negative for dysuria.   Musculoskeletal:  Negative for back pain.   Skin:  Negative for rash.   Neurological:  Negative for weakness.   Hematological:  Does not bruise/bleed easily.     Physical Exam     Initial Vitals [05/23/23 1601]   BP Pulse Resp Temp SpO2   (!) 172/98 98 18 98.2 °F (36.8 °C) 98 %      MAP       --         Physical Exam    Nursing  note and vitals reviewed.  Constitutional: She appears well-developed and well-nourished.   HENT:   Head: Normocephalic and atraumatic.   Right Ear: External ear normal.   Left Ear: External ear normal.   Eyes: Conjunctivae and EOM are normal. Pupils are equal, round, and reactive to light.   Neck: Neck supple.   Normal range of motion.  Cardiovascular:  Normal rate, regular rhythm, normal heart sounds and intact distal pulses.           Pulmonary/Chest: Breath sounds normal.   Abdominal: Abdomen is soft. Bowel sounds are normal.   obese   Musculoskeletal:         General: Normal range of motion.      Cervical back: Normal range of motion and neck supple.     Neurological: She is alert and oriented to person, place, and time. GCS score is 15. GCS eye subscore is 4. GCS verbal subscore is 5. GCS motor subscore is 6.   Skin: Skin is warm and dry. Capillary refill takes less than 2 seconds.   Psychiatric: Her behavior is normal. Judgment and thought content normal. Her mood appears anxious.       ED Course   Procedures  Labs Reviewed   COMPREHENSIVE METABOLIC PANEL - Abnormal; Notable for the following components:       Result Value    Carbon Dioxide 21 (*)     Glucose Level 115 (*)     All other components within normal limits   URINALYSIS, REFLEX TO URINE CULTURE - Abnormal; Notable for the following components:    Leukocyte Esterase, UA Trace (*)     All other components within normal limits   CBC WITH DIFFERENTIAL - Abnormal; Notable for the following components:    MCH 31.3 (*)     MPV 10.8 (*)     All other components within normal limits   URINALYSIS, MICROSCOPIC - Abnormal; Notable for the following components:    Bacteria, UA Trace (*)     Squamous Epithelial Cells, UA Few (*)     All other components within normal limits   B-TYPE NATRIURETIC PEPTIDE - Normal   COVID/FLU A&B PCR - Normal    Narrative:     The Xpert Xpress SARS-CoV-2/FLU/RSV plus is a rapid, multiplexed real-time PCR test intended for the  simultaneous qualitative detection and differentiation of SARS-CoV-2, Influenza A, Influenza B, and respiratory syncytial virus (RSV) viral RNA in either nasopharyngeal swab or nasal swab specimens.         MAGNESIUM - Normal   TROPONIN I - Normal   TSH - Normal   CULTURE, URINE   CBC W/ AUTO DIFFERENTIAL    Narrative:     The following orders were created for panel order CBC auto differential.  Procedure                               Abnormality         Status                     ---------                               -----------         ------                     CBC with Differential[[id number]]        Abnormal            Final result                 Please view results for these tests on the individual orders.   HEMOGLOBIN A1C   POCT GLUCOSE, HAND-HELD DEVICE   POCT GLUCOSE   POCT GLUCOSE MONITORING CONTINUOUS     EKG Readings: (Independently Interpreted)   Initial Reading: No STEMI. Rhythm: Normal Sinus Rhythm. Heart Rate: 94. Ectopy: No Ectopy. Conduction: Normal. ST Segments: Normal ST Segments. T Waves: Normal. Axis: Normal. Clinical Impression: Normal Sinus Rhythm   ECG Results              EKG 12-lead (Final result)  Result time 05/24/23 09:31:01      Final result by Interface, Lab In [facility] (05/24/23 09:31:01)                   Narrative:    Test Reason : R07.9,    Vent. Rate : 102 BPM     Atrial Rate : 102 BPM     P-R Int : 132 ms          QRS Dur : 078 ms      QT Int : 342 ms       P-R-T Axes : 043 051 049 degrees     QTc Int : 445 ms    Sinus tachycardia  Otherwise normal ECG  When compared with ECG of 23-MAY-2023 16:38,  No significant change was found  Confirmed by Noman Landers MD ([id number]) on 5/24/2023 9:30:51 AM    Referred By: [email or web address]   SELF           Confirmed By:Noman Landers MD                                     EKG 12-lead (Final result)  Result time 05/24/23 09:33:02      Final result by Interface, Lab In [facility] (05/24/23 09:33:02)                   Narrative:    Test Reason :  R42,    Vent. Rate : 094 BPM     Atrial Rate : 094 BPM     P-R Int : 132 ms          QRS Dur : 080 ms      QT Int : 330 ms       P-R-T Axes : 045 050 042 degrees     QTc Int : 412 ms    Normal sinus rhythm  Normal ECG  When compared with ECG of 05-JUL-2022 11:12,  Nonspecific T wave abnormality, improved in Inferior leads  Nonspecific T wave abnormality no longer evident in Anterior-lateral leads  Confirmed by Noman Landers MD ([id number]) on 5/24/2023 9:32:50 AM    Referred By: [email or web address]   SELF           Confirmed By:Noman Landers MD                                  Imaging Results              CTA Chest Non-Coronary (PE Studies) (Final result)  Result time 05/23/23 20:12:04      Final result by Jose Torres MD (05/23/23 20:12:04)                   Impression:      NO PULMONARY EMBOLISM IDENTIFIED.      Electronically signed by: Jose Torres  Date:    05/23/2023  Time:    20:12               Narrative:    EXAMINATION:  CTA CHEST NON CORONARY (PE STUDIES)    CLINICAL HISTORY:  chest pain, vertigo, eval for dissection;    TECHNIQUE:  Sequential axial images performed of the chest using pulmonary embolism protocol following intravenous contrast bolus. Sagittal and contrast reformations performed.    Dose product length of 107 mGycm. Automated exposure control was utilized to minimize radiation dose.    COMPARISON:  Chest radiograph July 5, 2022    FINDINGS:  Optimal contrast bolus timing with adequately opacified pulmonary artery system is without evidence of filling defects from pulmonary thromboembolism within the main pulmonary artery and the major branches. Thoracic aorta maintains unremarkable course and diameter.    Bilateral lungs dependent hypoventilatory changes.  The lungs are clear of groundglass pneumonitis or pulmonary venous hypertension. There are no pulmonary consolidations. The pleural and pericardial spaces are without fluid accumulation.  Chest lymph nodes are not enlarged.  Osseous structures  without significant findings.                                       CT Head Without Contrast (Final result)  Result time 05/23/23 17:27:51      Final result by Neisha Dawson MD (05/23/23 17:27:51)                   Impression:      No acute abnormality seen      Electronically signed by: Neisha Dawson  Date:    05/23/2023  Time:    17:27               Narrative:    EXAMINATION:  CT HEAD WITHOUT CONTRAST    CLINICAL HISTORY:  Dizziness, persistent/recurrent, cardiac or vascular cause suspected;    TECHNIQUE:  Multiple axial images were obtained from the base of the brain to the vertex without contrast administration.  Sagittal and coronal reconstructions were performed. .Automatic exposure control  (AEC) is utilized to reduce patient radiation exposure.    COMPARISON:  None    FINDINGS:  There is no intracranial mass or lesion seen.  No hemorrhage is seen.  No infarct is seen.  The ventricles and basilar cisterns appear normal.  Brain parenchyma appears grossly unremarkable.    Posterior fossa appears normal.  The calvarium is intact.  The paranasal sinuses appear grossly unremarkable.                                       Medications   meclizine (ANTIVERT) 25 mg tablet (has no administration in time range)   acetaminophen (TYLENOL) 500 MG tablet (has no administration in time range)   pantoprazole EC tablet 40 mg (40 mg Oral Given 5/24/23 1719)   fluticasone propionate 50 mcg/actuation nasal spray 50 mcg (50 mcg Each Nostril Given 5/24/23 2141)   levothyroxine tablet 50 mcg (50 mcg Oral Given 5/24/23 0919)   acetaminophen tablet 650 mg (650 mg Oral Given 5/24/23 1158)   ondansetron injection 4 mg (4 mg Intravenous Given 5/25/23 0556)   dextrose 50% injection 25 g (has no administration in time range)   glucagon (human recombinant) injection 1 mg (has no administration in time range)   dextrose 10% bolus 125 mL 125 mL (has no administration in time range)   dextrose 10% bolus 250 mL 250 mL (has no  administration in time range)   glucose chewable tablet 16 g (has no administration in time range)   glucose chewable tablet 24 g (has no administration in time range)   insulin aspart U-100 injection 0-5 Units (has no administration in time range)   ketorolac injection 15 mg (15 mg Intravenous Given 5/25/23 0556)   0.9%  NaCl infusion ( Intravenous New Bag 5/25/23 0548)   enoxaparin injection 40 mg (40 mg Subcutaneous Given 5/24/23 1636)   ALPRAZolam tablet 0.25 mg (0.25 mg Oral Given 5/24/23 1655)   diphenhydrAMINE injection 25 mg (25 mg Intravenous Given 5/25/23 0556)   promethazine injection 25 mg (25 mg Intramuscular Given 5/24/23 2302)   vortioxetine Tab 20 mg (20 mg Oral Given 5/24/23 2235)   hydrOXYzine tablet 50 mg (has no administration in time range)   amLODIPine tablet 2.5 mg (has no administration in time range)   LORazepam (ATIVAN) injection 2 mg (has no administration in time range)   atorvastatin tablet 20 mg (20 mg Oral Not Given 5/24/23 2100)   cefTRIAXone (ROCEPHIN) 1 g in dextrose 5 % in water (D5W) 5 % 50 mL IVPB (MB+) (0 g Intravenous Stopped 5/25/23 0618)   ALPRAZolam 24 hr tablet 1 mg (1 mg Oral Given 5/24/23 2100)   lactated ringers bolus 1,000 mL (0 mLs Intravenous Stopped 5/23/23 1800)   LORazepam injection 1 mg (1 mg Intravenous Given 5/23/23 1655)   ondansetron injection 4 mg (4 mg Intravenous Given 5/23/23 1655)   meclizine tablet 25 mg (25 mg Oral Given 5/23/23 1915)   acetaminophen tablet 1,000 mg (1,000 mg Oral Given 5/23/23 1930)   0.9%  NaCl infusion (0 mLs Intravenous Stopped 5/23/23 2100)   oxyCODONE immediate release tablet 5 mg (5 mg Oral Given 5/23/23 1948)   iopamidoL (ISOVUE-370) injection 100 mL (100 mLs Intravenous Given 5/23/23 2005)   0.9%  NaCl infusion (0 mLs Intravenous Stopped 5/23/23 2200)   0.45% NaCl infusion ( Intravenous New Bag 5/24/23 0020)   ketorolac injection 15 mg (15 mg Intravenous Given 5/24/23 0045)   amLODIPine tablet 2.5 mg (2.5 mg Oral Given 5/24/23  1847)      Pt presents with dizziness, weakness, muscle aches, malaise.   She is very histrionic during the interview.  Differential includes: vertigo, CVA, hypotension, anemia, myocardial ischemia, electrolyte disturbance, labyrinthitis, anxiety.  Will obtain CT head, EKG, CBC, CMP, troponin, UA.  Care turned over to Dr. Gonzalez at 1900.           ED Course as of 05/25/23 0657   Tue May 23, 2023   1917 Patient now complaining of head and chest pain [GM]   1947 EKG Interpretation 7:47 PM    Rate: 102  Rhythm: NSR  QRS: WNL  Qtc: WNL  No signs of ischemia  Nonspecific T wave abnormalities present   [GM]   2004 She described orthostatic type symptoms earlier with corresponding BP changes and she felt a little better after fluids [GM]   2029 Ortho statics positive here. Will re-eval after 2 L fluids  [GM]   2220 Went to rfe-evaluate patient. She states she is still feeling too weak to go home and is having persistent dizziness.  She states she feels like the room is constantly spinning. On repeat examination she has no nystagmus. Her finger to nose test is slow but no dysmetria. She has been on 2 L NC for comfort. On RA, her oxygen levels maintain between 93-94%. Will attempt to ambulate.  [GM]   2223 Attempted to ambulate patient but she was unable to even get out of the bed without feeling like she would pass out. Off oxygen her sats are between 92-96% [GM]   2229 Message left with hospitalist for admit []      ED Course User Index  [GM] Emily Gonzalez MD                 Clinical Impression:   Final diagnoses:  [R42] Dizziness  [R07.9] Chest pain  [R42, R55] Postural dizziness with near syncope        ED Disposition Condition    Admit Stable                Heath Yarbrough MD  05/25/23 0657

## 2023-05-24 PROBLEM — R42 DIZZINESS: Status: ACTIVE | Noted: 2023-05-24

## 2023-05-24 LAB
AV INDEX (PROSTH): 0.78
AV MEAN GRADIENT: 6 MMHG
AV PEAK GRADIENT: 11 MMHG
AV VELOCITY RATIO: 0.77
BSA FOR ECHO PROCEDURE: 2.15 M2
CV ECHO LV RWT: 0.51 CM
DOP CALC AO PEAK VEL: 1.66 M/S
DOP CALC AO VTI: 38.6 CM
DOP CALC LVOT PEAK VEL: 1.27 M/S
DOP CALCLVOT PEAK VEL VTI: 30 CM
E WAVE DECELERATION TIME: 211 MSEC
E/A RATIO: 0.82
E/E' RATIO: 6.72 M/S
ECHO LV POSTERIOR WALL: 1.16 CM (ref 0.6–1.1)
EJECTION FRACTION: 65 %
FRACTIONAL SHORTENING: 33 % (ref 28–44)
INTERVENTRICULAR SEPTUM: 0.95 CM (ref 0.6–1.1)
LEFT ATRIUM SIZE: 3.8 CM
LEFT ATRIUM VOLUME INDEX MOD: 21.7 ML/M2
LEFT ATRIUM VOLUME MOD: 44.3 CM3
LEFT INTERNAL DIMENSION IN SYSTOLE: 3.03 CM (ref 2.1–4)
LEFT VENTRICLE DIASTOLIC VOLUME INDEX: 46.52 ML/M2
LEFT VENTRICLE DIASTOLIC VOLUME: 94.9 ML
LEFT VENTRICLE MASS INDEX: 82 G/M2
LEFT VENTRICLE SYSTOLIC VOLUME INDEX: 17.6 ML/M2
LEFT VENTRICLE SYSTOLIC VOLUME: 35.9 ML
LEFT VENTRICULAR INTERNAL DIMENSION IN DIASTOLE: 4.55 CM (ref 3.5–6)
LEFT VENTRICULAR MASS: 168.01 G
LV LATERAL E/E' RATIO: 6 M/S
LV SEPTAL E/E' RATIO: 7.64 M/S
LVOT MG: 3 MMHG
LVOT MV: 0.84 CM/S
MV PEAK A VEL: 1.03 M/S
MV PEAK E VEL: 0.84 M/S
OHS LV EJECTION FRACTION SIMPSONS BIPLANE MOD: 6 %
POCT GLUCOSE: 70 MG/DL (ref 70–110)
PV PEAK VELOCITY: 1.01 CM/S
RIGHT VENTRICULAR END-DIASTOLIC DIMENSION: 3.71 CM
TDI LATERAL: 0.14 M/S
TDI SEPTAL: 0.11 M/S
TDI: 0.13 M/S
TRICUSPID ANNULAR PLANE SYSTOLIC EXCURSION: 1.78 CM

## 2023-05-24 PROCEDURE — 25000003 PHARM REV CODE 250: Performed by: INTERNAL MEDICINE

## 2023-05-24 PROCEDURE — 96375 TX/PRO/DX INJ NEW DRUG ADDON: CPT | Mod: 59

## 2023-05-24 PROCEDURE — 63600175 PHARM REV CODE 636 W HCPCS: Performed by: INTERNAL MEDICINE

## 2023-05-24 PROCEDURE — 25000003 PHARM REV CODE 250: Performed by: EMERGENCY MEDICINE

## 2023-05-24 PROCEDURE — G0378 HOSPITAL OBSERVATION PER HR: HCPCS

## 2023-05-24 PROCEDURE — 96372 THER/PROPH/DIAG INJ SC/IM: CPT | Mod: 59

## 2023-05-24 PROCEDURE — 96376 TX/PRO/DX INJ SAME DRUG ADON: CPT | Mod: 59

## 2023-05-24 PROCEDURE — 63600175 PHARM REV CODE 636 W HCPCS

## 2023-05-24 PROCEDURE — 25000242 PHARM REV CODE 250 ALT 637 W/ HCPCS: Performed by: INTERNAL MEDICINE

## 2023-05-24 PROCEDURE — 99223 1ST HOSP IP/OBS HIGH 75: CPT | Mod: ,,, | Performed by: PSYCHIATRY & NEUROLOGY

## 2023-05-24 PROCEDURE — 96372 THER/PROPH/DIAG INJ SC/IM: CPT | Mod: 59 | Performed by: INTERNAL MEDICINE

## 2023-05-24 PROCEDURE — 27000221 HC OXYGEN, UP TO 24 HOURS

## 2023-05-24 PROCEDURE — 94761 N-INVAS EAR/PLS OXIMETRY MLT: CPT

## 2023-05-24 PROCEDURE — 99223 PR INITIAL HOSPITAL CARE,LEVL III: ICD-10-PCS | Mod: ,,, | Performed by: PSYCHIATRY & NEUROLOGY

## 2023-05-24 PROCEDURE — 63600175 PHARM REV CODE 636 W HCPCS: Performed by: EMERGENCY MEDICINE

## 2023-05-24 PROCEDURE — 96361 HYDRATE IV INFUSION ADD-ON: CPT

## 2023-05-24 RX ORDER — ONDANSETRON 2 MG/ML
4 INJECTION INTRAMUSCULAR; INTRAVENOUS EVERY 6 HOURS PRN
Status: DISCONTINUED | OUTPATIENT
Start: 2023-05-24 | End: 2023-05-28 | Stop reason: HOSPADM

## 2023-05-24 RX ORDER — ALPRAZOLAM 0.25 MG/1
0.25 TABLET ORAL 4 TIMES DAILY PRN
Status: DISCONTINUED | OUTPATIENT
Start: 2023-05-24 | End: 2023-05-27

## 2023-05-24 RX ORDER — METFORMIN HYDROCHLORIDE 500 MG/1
500 TABLET, EXTENDED RELEASE ORAL DAILY
Status: DISCONTINUED | OUTPATIENT
Start: 2023-05-24 | End: 2023-05-24

## 2023-05-24 RX ORDER — SODIUM CHLORIDE 9 MG/ML
INJECTION, SOLUTION INTRAVENOUS CONTINUOUS
Status: DISCONTINUED | OUTPATIENT
Start: 2023-05-24 | End: 2023-05-25

## 2023-05-24 RX ORDER — KETOROLAC TROMETHAMINE 30 MG/ML
15 INJECTION, SOLUTION INTRAMUSCULAR; INTRAVENOUS
Status: COMPLETED | OUTPATIENT
Start: 2023-05-24 | End: 2023-05-24

## 2023-05-24 RX ORDER — AMLODIPINE BESYLATE 2.5 MG/1
2.5 TABLET ORAL ONCE
Status: COMPLETED | OUTPATIENT
Start: 2023-05-24 | End: 2023-05-24

## 2023-05-24 RX ORDER — ATORVASTATIN CALCIUM 10 MG/1
20 TABLET, FILM COATED ORAL DAILY
Status: DISCONTINUED | OUTPATIENT
Start: 2023-05-24 | End: 2023-05-24

## 2023-05-24 RX ORDER — LEVOTHYROXINE SODIUM 50 UG/1
50 TABLET ORAL DAILY
Status: DISCONTINUED | OUTPATIENT
Start: 2023-05-24 | End: 2023-05-28 | Stop reason: HOSPADM

## 2023-05-24 RX ORDER — IBUPROFEN 200 MG
24 TABLET ORAL
Status: DISCONTINUED | OUTPATIENT
Start: 2023-05-24 | End: 2023-05-28 | Stop reason: HOSPADM

## 2023-05-24 RX ORDER — ENOXAPARIN SODIUM 100 MG/ML
40 INJECTION SUBCUTANEOUS EVERY 24 HOURS
Status: DISCONTINUED | OUTPATIENT
Start: 2023-05-24 | End: 2023-05-28 | Stop reason: HOSPADM

## 2023-05-24 RX ORDER — PROMETHAZINE HYDROCHLORIDE 25 MG/ML
25 INJECTION, SOLUTION INTRAMUSCULAR; INTRAVENOUS EVERY 6 HOURS PRN
Status: DISCONTINUED | OUTPATIENT
Start: 2023-05-24 | End: 2023-05-28 | Stop reason: HOSPADM

## 2023-05-24 RX ORDER — ALPRAZOLAM 0.5 MG/1
1 TABLET ORAL 2 TIMES DAILY PRN
Status: DISCONTINUED | OUTPATIENT
Start: 2023-05-24 | End: 2023-05-24

## 2023-05-24 RX ORDER — PANTOPRAZOLE SODIUM 40 MG/1
40 TABLET, DELAYED RELEASE ORAL DAILY
Status: DISCONTINUED | OUTPATIENT
Start: 2023-05-24 | End: 2023-05-28 | Stop reason: HOSPADM

## 2023-05-24 RX ORDER — KETOROLAC TROMETHAMINE 30 MG/ML
15 INJECTION, SOLUTION INTRAMUSCULAR; INTRAVENOUS EVERY 6 HOURS PRN
Status: DISPENSED | OUTPATIENT
Start: 2023-05-24 | End: 2023-05-25

## 2023-05-24 RX ORDER — DIPHENHYDRAMINE HYDROCHLORIDE 50 MG/ML
25 INJECTION INTRAMUSCULAR; INTRAVENOUS EVERY 6 HOURS PRN
Status: DISCONTINUED | OUTPATIENT
Start: 2023-05-24 | End: 2023-05-27

## 2023-05-24 RX ORDER — SODIUM CHLORIDE 450 MG/100ML
INJECTION, SOLUTION INTRAVENOUS
Status: COMPLETED | OUTPATIENT
Start: 2023-05-24 | End: 2023-05-24

## 2023-05-24 RX ORDER — ALPRAZOLAM 1 MG/1
1 TABLET, EXTENDED RELEASE ORAL EVERY 12 HOURS
Status: DISCONTINUED | OUTPATIENT
Start: 2023-05-24 | End: 2023-05-28 | Stop reason: HOSPADM

## 2023-05-24 RX ORDER — ATORVASTATIN CALCIUM 10 MG/1
20 TABLET, FILM COATED ORAL NIGHTLY
Status: DISCONTINUED | OUTPATIENT
Start: 2023-05-25 | End: 2023-05-24

## 2023-05-24 RX ORDER — METHENAMINE, SODIUM PHOSPHATE, MONOBASIC, ANHYDROUS, PHENYL SALICYLATE, METHYLENE BLUE AND HYOSCYAMINE SULFATE 118; 40.8; 36; 10; .12 MG/1; MG/1; MG/1; MG/1; MG/1
1 CAPSULE ORAL DAILY PRN
COMMUNITY
Start: 2023-02-21

## 2023-05-24 RX ORDER — INSULIN ASPART 100 [IU]/ML
0-5 INJECTION, SOLUTION INTRAVENOUS; SUBCUTANEOUS
Status: DISCONTINUED | OUTPATIENT
Start: 2023-05-24 | End: 2023-05-28 | Stop reason: HOSPADM

## 2023-05-24 RX ORDER — GLUCAGON 1 MG
1 KIT INJECTION
Status: DISCONTINUED | OUTPATIENT
Start: 2023-05-24 | End: 2023-05-28 | Stop reason: HOSPADM

## 2023-05-24 RX ORDER — HYDROXYZINE HYDROCHLORIDE 50 MG/1
50 TABLET, FILM COATED ORAL NIGHTLY PRN
Status: DISCONTINUED | OUTPATIENT
Start: 2023-05-24 | End: 2023-05-28 | Stop reason: HOSPADM

## 2023-05-24 RX ORDER — AMLODIPINE BESYLATE 2.5 MG/1
2.5 TABLET ORAL DAILY
Status: DISCONTINUED | OUTPATIENT
Start: 2023-05-25 | End: 2023-05-28 | Stop reason: HOSPADM

## 2023-05-24 RX ORDER — ACETAMINOPHEN 325 MG/1
650 TABLET ORAL EVERY 6 HOURS PRN
Status: DISCONTINUED | OUTPATIENT
Start: 2023-05-24 | End: 2023-05-25

## 2023-05-24 RX ORDER — IBUPROFEN 200 MG
16 TABLET ORAL
Status: DISCONTINUED | OUTPATIENT
Start: 2023-05-24 | End: 2023-05-28 | Stop reason: HOSPADM

## 2023-05-24 RX ORDER — ATORVASTATIN CALCIUM 10 MG/1
20 TABLET, FILM COATED ORAL NIGHTLY
Status: DISCONTINUED | OUTPATIENT
Start: 2023-05-24 | End: 2023-05-25

## 2023-05-24 RX ORDER — FLUTICASONE PROPIONATE 50 MCG
1 SPRAY, SUSPENSION (ML) NASAL 2 TIMES DAILY
Status: DISCONTINUED | OUTPATIENT
Start: 2023-05-24 | End: 2023-05-28 | Stop reason: HOSPADM

## 2023-05-24 RX ADMIN — PANTOPRAZOLE SODIUM 40 MG: 40 TABLET, DELAYED RELEASE ORAL at 05:05

## 2023-05-24 RX ADMIN — ALPRAZOLAM 0.25 MG: 0.25 TABLET ORAL at 11:05

## 2023-05-24 RX ADMIN — ONDANSETRON 4 MG: 2 INJECTION INTRAMUSCULAR; INTRAVENOUS at 04:05

## 2023-05-24 RX ADMIN — AMLODIPINE BESYLATE 2.5 MG: 2.5 TABLET ORAL at 06:05

## 2023-05-24 RX ADMIN — ALPRAZOLAM 0.25 MG: 0.25 TABLET ORAL at 04:05

## 2023-05-24 RX ADMIN — ACETAMINOPHEN 650 MG: 325 TABLET ORAL at 11:05

## 2023-05-24 RX ADMIN — VORTIOXETINE 20 MG: 20 TABLET, FILM COATED ORAL at 10:05

## 2023-05-24 RX ADMIN — ALPRAZOLAM 1 MG: 0.5 TABLET ORAL at 11:05

## 2023-05-24 RX ADMIN — KETOROLAC TROMETHAMINE 15 MG: 30 INJECTION, SOLUTION INTRAMUSCULAR; INTRAVENOUS at 04:05

## 2023-05-24 RX ADMIN — KETOROLAC TROMETHAMINE 15 MG: 30 INJECTION, SOLUTION INTRAMUSCULAR; INTRAVENOUS at 11:05

## 2023-05-24 RX ADMIN — SODIUM CHLORIDE: 9 INJECTION, SOLUTION INTRAVENOUS at 05:05

## 2023-05-24 RX ADMIN — KETOROLAC TROMETHAMINE 15 MG: 30 INJECTION, SOLUTION INTRAMUSCULAR; INTRAVENOUS at 12:05

## 2023-05-24 RX ADMIN — ENOXAPARIN SODIUM 40 MG: 40 INJECTION SUBCUTANEOUS at 04:05

## 2023-05-24 RX ADMIN — FLUTICASONE PROPIONATE 50 MCG: 50 SPRAY, METERED NASAL at 04:05

## 2023-05-24 RX ADMIN — CEFTRIAXONE SODIUM 1 G: 1 INJECTION, POWDER, FOR SOLUTION INTRAMUSCULAR; INTRAVENOUS at 05:05

## 2023-05-24 RX ADMIN — DIPHENHYDRAMINE HYDROCHLORIDE 25 MG: 50 INJECTION INTRAMUSCULAR; INTRAVENOUS at 04:05

## 2023-05-24 RX ADMIN — LEVOTHYROXINE SODIUM 50 MCG: 50 TABLET ORAL at 09:05

## 2023-05-24 RX ADMIN — FLUTICASONE PROPIONATE 50 MCG: 50 SPRAY, METERED NASAL at 09:05

## 2023-05-24 RX ADMIN — SODIUM CHLORIDE: 9 INJECTION, SOLUTION INTRAVENOUS at 04:05

## 2023-05-24 RX ADMIN — SODIUM CHLORIDE: 4.5 INJECTION, SOLUTION INTRAVENOUS at 12:05

## 2023-05-24 RX ADMIN — PROMETHAZINE HYDROCHLORIDE 25 MG: 25 INJECTION INTRAMUSCULAR; INTRAVENOUS at 11:05

## 2023-05-24 RX ADMIN — DIPHENHYDRAMINE HYDROCHLORIDE 25 MG: 50 INJECTION INTRAMUSCULAR; INTRAVENOUS at 11:05

## 2023-05-24 RX ADMIN — ALPRAZOLAM 1 MG: 1 TABLET, EXTENDED RELEASE ORAL at 09:05

## 2023-05-24 NOTE — H&P
Chief Complaint; Weakness and dizziness over the past 2 days    HPI:   Patient is a 52 y.o. female with a medical history of anxiety/depression, hypertension, gastroesophageal reflux disease and type 2 diabetes mellitus presented with complaint of weakness and dizziness over the past 2 days.    Patient has been at her usual state of health until about 2 days ago when she developed progressive generalized body weakness associated with dizziness and almost passing out.  She denies any fever.  She also admits to sore throat and mild shortness of breaths.  No chest pain.  No fever.  She decided to come to the ER further evaluation.    At the ER, CT of the brain shows no acute findings.  CTA of the chest was negative for PE or pneumonia.  Blood pressure has been relatively low.  Urinalysis appears to be positive for UTI.    PCP Roberto Araujo MD MD        Past Medical History:   Diagnosis Date    Anxiety disorder, unspecified     Depression     Diabetes mellitus     GERD (gastroesophageal reflux disease)     Hypertension         Past Surgical History:   Procedure Laterality Date    HYSTERECTOMY          (Not in a hospital admission)  Home medications; please see medication reconciliation chart    Review of patient's allergies indicates:   Allergen Reactions    Hydrocodone     Meperidine      Other reaction(s): Panic attack    Ciprofloxacin Nausea Only    Gabapentin Anxiety    Sulfamethoxazole-trimethoprim Nausea Only        Social History     Tobacco Use    Smoking status: Former     Types: Cigarettes     Quit date: 2020     Years since quitting: 3.3    Smokeless tobacco: Never   Substance Use Topics    Alcohol use: Not on file        No family history on file.    Review of Systems  Review of Systems   Constitutional: Negative for fever.  Positive for weakness and dizziness  HEENT: Negative for drooling, ear pain, facial swelling and nosebleeds.    Eyes: Negative for discharge and visual disturbance.   Respiratory:  "Negative for cough, negative shortness of breath.    Cardiovascular: negative for chest pain or SOB  Gastrointestinal: Negative for anal bleeding and rectal pain. Negative Nausea or Vomiting.  Genitourinary: Negative for decreased urine volume and dysuria  Musculoskeletal: Negative for neck pain. , complained of right lower extremity pain and swelling  Skin: Negative for rash.   Neurological: negative for numbness, negative for weakness,negative for seizures and facial asymmetry.              Objective:     I/O this shift:  In: 2000 [I.V.:2000]  Out: -     /74   Pulse 83   Temp 98.2 °F (36.8 °C) (Oral)   Resp 15   Ht 5' 2" (1.575 m)   Wt 106.1 kg (234 lb)   SpO2 (!) 94%   BMI 42.80 kg/m²     General Appearance:    Awake, alert, not in acute distress   HEENT:   atraumatic, PERRL, EOM intact, conjuctiva pink, sclera anicteric, oropharynx moist, no lesions noted   Neck:    Supple, no JVD, no carotid bruits, no lymphadenopathy or thyromegaly noted       Pulmonary:   Clear to auscultation bilaterally, reduced breath sounds bileterally.   Cardiovascular:    Regular rate and rhythm, S1 S2 normal   Abdomen:     Soft, non-tender,nondistended, bowel sounds active all four quadrants, no masses, no organomegaly   Extremities:  no edema, no cyanosis or clubbing noted       Skin:   No bruises noted.       Neurologic: Alert, awake, oriented x 3, moves all extremities.                 Data Review :   Labs:    CBC:   Lab Results   Component Value Date    WBC 8.70 05/23/2023    RBC 4.22 05/23/2023    HGB 13.2 05/23/2023    HCT 38.0 05/23/2023    HCT 43.0 09/07/2019     05/23/2023     CMP:   Lab Results   Component Value Date     05/23/2023    K 4.3 05/23/2023    CO2 21 (L) 05/23/2023    BUN 13.7 05/23/2023    CREATININE 0.94 05/23/2023    CALCIUM 10.0 05/23/2023    ALKPHOS 47 05/23/2023    AST 24 05/23/2023    ALT 25 05/23/2023    ALBUMIN 4.0 05/23/2023    BILITOT 0.8 05/23/2023     Cardiac markers:   Lab " Results   Component Value Date    BNP 22.4 05/23/2023       Radiology:  Micro:  No components found for: BLOODCX, SPUTUMCX, URINECX    Radiology:  [unfilled]        Assessment & Plan:   1. Probable near syncope; unclear etiology, rule out orthostatic hypotension, elevated to UTI.  CT of the brain shows no acute findings.  CT of the chest negative for pulmonary embolism.  Place patient on IV fluids.  Obtain orthostatic vital signs.  Monitor blood pressure closely.  Hold off on antihypertensive medication.    2. Urinary tract infection; start patient on IV Rocephin.  Follow up the urine culture results.    3. Hypertension; blood pressure relatively low, hold off on antihypertensive medication and monitor blood pressure closely.    4. Type 2 diabetes mellitus; continue insulin therapy and monitor finger glucose serially.  Obtain A1c.    5. Hypothyroidism; continue Synthroid    6. Gastroesophageal reflux disease; continue Protonix    7. Hyperlipidemia; continue statin    8. History of anxiety and depression; continue Xanax.  Reduced to 0.5 mg twice daily p.r.n.    9. Maintain the patient on DVT prophylaxis by way of Lovenox.      We will obtain right lower extremity venous Doppler ultrasound to rule out DVT given her complaints of right lower extremity swelling and pain.      Disposition; patient has been transferred to [facility] for further management.  This note was completed using voice recognition software and transcription errors may occur.    This Encounter was via Telemedicine (Both audio and visual ) and from [place], [place].  Patient was located in [place].    Evaluation  of the patient was done alongside the patient's nursing staff    Patient will be placed on observation status       Dave Melendez  5/24/2023  4:11 AM

## 2023-05-24 NOTE — SUBJECTIVE & OBJECTIVE
Past Medical History:   Diagnosis Date    Anxiety disorder, unspecified     Depression     Diabetes mellitus     GERD (gastroesophageal reflux disease)     Hypertension        Past Surgical History:   Procedure Laterality Date    HYSTERECTOMY         Review of patient's allergies indicates:   Allergen Reactions    Hydrocodone     Meperidine      Other reaction(s): Panic attack    Ciprofloxacin Nausea Only    Gabapentin Anxiety    Sulfamethoxazole-trimethoprim Nausea Only       Current Neurological Medications:     No current facility-administered medications on file prior to encounter.     Current Outpatient Medications on File Prior to Encounter   Medication Sig    ALPRAZolam (XANAX) 1 MG tablet Take 1 mg by mouth 2 (two) times daily as needed.    amLODIPine (NORVASC) 2.5 MG tablet Take 2.5 mg by mouth once daily.    esomeprazole (NEXIUM) 40 MG capsule Take 40 mg by mouth once daily.    fluticasone propionate (FLONASE) 50 mcg/actuation nasal spray 1 spray by Each Nostril route 2 (two) times daily.    hydrOXYzine (ATARAX) 50 MG tablet Take  mg by mouth nightly as needed.    levothyroxine (SYNTHROID) 50 MCG tablet Take 50 mcg by mouth once daily.    metFORMIN (GLUCOPHAGE-XR) 500 MG ER 24hr tablet Take 500 mg by mouth once daily.    olmesartan (BENICAR) 40 MG tablet Take 40 mg by mouth once daily.    propranoloL (INDERAL) 60 MG tablet Take 60 mg by mouth 2 (two) times daily.    rosuvastatin (CRESTOR) 20 MG tablet Take 20 mg by mouth once daily.    spironolactone (ALDACTONE) 25 MG tablet Take 25 mg by mouth once daily.    TRINTELLIX 20 mg Tab Take 1 tablet by mouth once daily.    URO--10-40.8-36 mg Cap Take 1 capsule by mouth daily as needed.     Family History    None       Tobacco Use    Smoking status: Former     Types: Cigarettes     Quit date: 2020     Years since quitting: 3.3    Smokeless tobacco: Never   Substance and Sexual Activity    Alcohol use: Not on file    Drug use: Never    Sexual activity:  Not on file     Review of Systems  A 14pt ros was reviewed & is negative unless o/w documented in the hpi    Objective:     Vital Signs (Most Recent):  Temp: 98.2 °F (36.8 °C) (05/24/23 1106)  Pulse: 63 (05/24/23 1106)  Resp: 17 (05/24/23 1106)  BP: 119/82 (05/24/23 1125)  SpO2: 98 % (05/24/23 1106) Vital Signs (24h Range):  Temp:  [97.8 °F (36.6 °C)-98.2 °F (36.8 °C)] 98.2 °F (36.8 °C)  Pulse:  [] 63  Resp:  [14-18] 17  SpO2:  [94 %-99 %] 98 %  BP: (109-172)/(71-98) 119/82     Weight: 106.1 kg (234 lb)  Body mass index is 42.79 kg/m².     Physical Exam  Vitals reviewed.   Constitutional:       General: She is awake.      Appearance: Normal appearance. She is obese.   HENT:      Head: Normocephalic and atraumatic.      Nose: Nose normal.      Mouth/Throat:      Mouth: Mucous membranes are moist.   Eyes:      Extraocular Movements: Extraocular movements intact and EOM normal.      Pupils: Pupils are equal, round, and reactive to light.   Pulmonary:      Effort: Pulmonary effort is normal.   Musculoskeletal:         General: Normal range of motion.      Cervical back: Normal range of motion.   Skin:     General: Skin is warm and dry.   Neurological:      Mental Status: She is oriented to person, place, and time.      Coordination: Finger-Nose-Finger Test normal.   Psychiatric:         Mood and Affect: Mood normal.         Speech: Speech normal.         Behavior: Behavior is cooperative.         Thought Content: Thought content normal.         Judgment: Judgment normal.        NEUROLOGICAL EXAMINATION:     MENTAL STATUS   Oriented to person, place, and time.   Follows 3 step commands.   Attention: normal. Concentration: normal.   Speech: speech is normal   Level of consciousness: alert  Knowledge: good.   Normal comprehension.     CRANIAL NERVES     CN II   Visual fields full to confrontation.     CN III, IV, VI   Pupils are equal, round, and reactive to light.  Extraocular motions are normal.   Nystagmus: none    Conjugate gaze: present    CN V   Facial sensation intact.     CN VII   Facial expression full, symmetric.     MOTOR EXAM   Right arm pronator drift: absent  Left arm pronator drift: absent    Strength   Right deltoid: 5/5  Left deltoid: 5/5  Right biceps: 5/5  Left biceps: 5/5  Right triceps: 5/5  Left triceps: 5/5  Right quadriceps: 5/5  Left quadriceps: 5/5  Right hamstrin/5  Left hamstrin/5  Right anterior tibial: 5/5  Left anterior tibial: 5/5  Right posterior tibial: 5/5  Left posterior tibial: 5/5    SENSORY EXAM   Light touch normal.     GAIT AND COORDINATION      Coordination   Finger to nose coordination: normal    Significant Labs:   Recent Lab Results         23  0920   23  2105   23  1846   23  1702        Influenza A, Molecular       Not Detected       Influenza B, Molecular       Not Detected       Albumin/Globulin Ratio       1.3       Albumin       4.0       Alkaline Phosphatase       47       ALT       25       Ao peak arielle 1.66             Ao VTI 38.6             Appearance, UA     Clear         AST       24       AV mean gradient 6             AV index (prosthetic) 0.78             AV peak gradient 11             AV Velocity Ratio 0.77             Bacteria, UA     Trace         Baso #       0.04       Basophil %       0.5       BILIRUBIN TOTAL       0.8       Bilirubin, UA     Negative         BNP       22.4       BSA 2.15             BUN       13.7       Calcium       10.0       Chloride       103       CO2       21       Color, UA     Yellow         Creatinine       0.94       Left Ventricle Relative Wall Thickness 0.51             E/A ratio 0.82             E/E' ratio 6.72             eGFR       >60       EF 65             Eos #       0.03       Eosinophil %       0.3       E wave deceleration time 211.00             FS 33             Globulin, Total       3.1       Glucose       115       Glucose, UA     Negative         Hematocrit       38.0       Hemoglobin        13.2       Immature Grans (Abs)       0.03       Immature Granulocytes       0.3       IVSd 0.95             Ketones, UA     Negative         LA size 3.80             LA volume 44.30             LA Volume Index (Mod) 21.7             Leukocytes, UA     Trace         LV LATERAL E/E' RATIO 6.00             LV SEPTAL E/E' RATIO 7.64             LV EDV BP 94.90             LV Diastolic Volume Index 46.52             LVIDd 4.55             LVIDs 3.03             LV mass 168.01             LV Mass Index 82             Left Ventricular Outflow Tract Mean Gradient 3.00             Left Ventricular Outflow Tract Mean Velocity 0.84             LVOT peak chago 1.27             LVOT peak VTI 30.00             LV ESV BP 35.90             LV Systolic Volume Index 17.6             Lymph #       1.61       LYMPH %       18.5       Magnesium       1.80       MCH       31.3       MCHC       34.7       MCV       90.0       Mean e' 0.13             Mono #       0.52       Mono %       6.0       MPV       10.8       MV Peak A Chago 1.03             MV Peak E Chago 0.84             Neut #       6.47       Neut %       74.4       NITRITE UA     Negative         nRBC       0.0       Occult Blood UA     Negative         [place]'s Biplane MOD Ejection Fraction 6             pH, UA     5.5         Platelets       227       POCT Glucose   93           Potassium       4.3       PROTEIN TOTAL       7.1       Protein, UA     Negative         PV PEAK VELOCITY 1.01             Posterior Wall 1.16             RBC       4.22       RBC, UA     None Seen         RDW       12.0       RVDD 3.71             SARS-CoV2 (COVID-19) Qualitative PCR       Not Detected       Sodium       136       Specific Gravity,UA     1.010         Squam Epithel, UA     Few         TAPSE 1.78             TDI SEPTAL 0.11             TDI LATERAL 0.14             Thyroid Stimulating Hormone       2.430       Troponin I       <0.010       Urobilinogen, UA     0.2         WBC, UA      0-2         WBC       8.70               Significant Imaging:   CT head w/o 5/23/2023:  FINDINGS:  There is no intracranial mass or lesion seen.  No hemorrhage is seen.  No infarct is seen.  The ventricles and basilar cisterns appear normal.  Brain parenchyma appears grossly unremarkable.     Posterior fossa appears normal.  The calvarium is intact.  The paranasal sinuses appear grossly unremarkable.     Impression:     No acute abnormality seen    I have reviewed all pertinent imaging results/findings within the past 24 hours.

## 2023-05-24 NOTE — PROGRESS NOTES
[facility] Hospital Medicine Progress Note        Chief Complaint: Inpatient Follow-up for Dizziness    HPI: Patient is a 52 y.o. female with a medical history of anxiety/depression, hypertension, gastroesophageal reflux disease and type 2 diabetes mellitus presented with complaint of weakness and dizziness over the past 2 days.    Patient has been at her usual state of health until about 2 days ago when she developed progressive generalized body weakness associated with dizziness and almost passing out.  She denies any fever.  She also admits to sore throat and mild shortness of breaths.  No chest pain.  No fever.  She decided to come to the ER further evaluation.    At the ER, CT of the brain shows no acute findings.  CTA of the chest was negative for PE or pneumonia.  Blood pressure has been relatively low.  Urinalysis appears to be positive for UTI.    Interval Hx:   Patient was seen and examined at bedside , reports having history of vertigo, but this appears to be different, feels extremely dizzy, reports having headache, able to see floaters, pain behind the eyes, pain in her left ear.  Family at bedside, concerned about her poor oral intake due to her new teeth being the cause .  Patient denies any fever, bowel or bladder issues, urinalysis positive for UTI.    Chart was reviewed, patient afebrile, hemodynamically stable      Objective/physical exam:  General: In no acute distress, afebrile  Chest: Clear to auscultation bilaterally  Heart: RRR, +S1, S2, no appreciable murmur  Abdomen: Soft, nontender, BS +  MSK: Warm, no lower extremity edema, no clubbing or cyanosis  Neurologic: Alert and oriented x4, Cranial nerve II-XII intact, Strength 5/5 in all 4 extremities    VITAL SIGNS: 24 HRS MIN & MAX LAST   Temp  Min: 97.8 °F (36.6 °C)  Max: 98.2 °F (36.8 °C) 97.9 °F (36.6 °C)   BP  Min: 109/71  Max: 145/81 (!) 143/84   Pulse  Min: 63  Max: 124  71   Resp  Min: 14  Max: 18 17    SpO2  Min: 94 %  Max: 99 % 96 %     I have reviewed the following labs:    Recent Labs   Lab 05/23/23  1702   WBC 8.70   RBC 4.22   HGB 13.2   HCT 38.0   MCV 90.0   MCH 31.3*   MCHC 34.7   RDW 12.0      MPV 10.8*       Recent Labs   Lab 05/23/23  1702      K 4.3   CO2 21*   BUN 13.7   CREATININE 0.94   CALCIUM 10.0   MG 1.80   ALBUMIN 4.0   ALKPHOS 47   ALT 25   AST 24   BILITOT 0.8          Microbiology Results (last 7 days)       Procedure Component Value Units Date/Time    Urine Culture High Risk [[id number]]     Order Status: Sent Specimen: Urine, Clean Catch              See below for Radiology    Scheduled Med:   [START ON 5/25/2023] amLODIPine  2.5 mg Oral Daily    atorvastatin  20 mg Oral QHS    cefTRIAXone (ROCEPHIN) IVPB  1 g Intravenous Q24H    enoxparin  40 mg Subcutaneous Q24H (prophylaxis, 1700)    fluticasone propionate  1 spray Each Nostril BID    levothyroxine  50 mcg Oral Daily    NON FORMULARY MEDICATION 1 mg  1 mg Oral Q12H    pantoprazole  40 mg Oral Daily    vortioxetine  1 tablet Oral Daily        Continuous Infusions:   sodium chloride 0.9% 75 mL/hr at 05/24/23 1636        PRN Meds:  acetaminophen, ALPRAZolam, dextrose 10%, dextrose 10%, dextrose 50%, diphenhydrAMINE, glucagon (human recombinant), glucose, glucose, hydrOXYzine, insulin aspart U-100, ketorolac, lorazepam, ondansetron, promethazine       Assessment/Plan:  Near syncope, dizziness, unknown cause  Headache, likely migraine  UTI  Chronic medical conditions   Hypertension, type 2 diabetes mellitus, hypothyroidism, GERD, hyperlipidemia, anxiety and depression      Plan   Follow-up on orthostatic vitals, hold on antihypertensives, continue hydration  EKG, Echocardiogram, head CT were done  Neurology was consulted for her dizziness and headaches, opines it is likely vertiginous migraine due to sinus infection , recommend to obtain an MRI  on analgesics p.r.n. for migraine  Continue oxygen supplementation if it might help  with headache  Continue antibiotics, currently on ceftriaxone, can deescalate to oral on discharge, Follow-up on urine cultures, monitor fever curve, WBC  Continue appropriate home medications , will hold on antihypertensives        VTE prophylaxis: lovenox    Patient condition:  fair    Anticipated discharge and Disposition:   TBD      All diagnosis and differential diagnosis have been reviewed; assessment and plan has been documented; I have personally reviewed the labs and test results that are presently available; I have reviewed the patients medication list; I have reviewed the consulting providers response and recommendations. I have reviewed or attempted to review medical records based upon their availability    All of the patient's questions have been  addressed and answered. Patient's is agreeable to the above stated plan. I will continue to monitor closely and make adjustments to medical management as needed.  _____________________________________________________________________    Nutrition Status:    Radiology:  I have personally reviewed the following imaging and agree with the radiologist.     Echo  · Normal systolic function.  · The estimated ejection fraction is 65%.  · Normal left ventricular diastolic function.  · Normal right ventricular size with normal right ventricular systolic   function.  · Mild mitral regurgitation.  · Mild tricuspid regurgitation.         Mckenna Cuellar MD   05/24/2023

## 2023-05-24 NOTE — PLAN OF CARE
05/24/23 1042   Discharge Assessment   Assessment Type Discharge Planning Assessment   Confirmed/corrected address, phone number and insurance Yes   Confirmed Demographics Correct on Facesheet   Source of Information patient   People in Home child(herberth), adult   Do you expect to return to your current living situation? Yes   Do you have help at home or someone to help you manage your care at home? Yes   Prior to hospitilization cognitive status: Alert/Oriented   Current cognitive status: Alert/Oriented   Home Accessibility stairs to enter home   Number of Stairs, Main Entrance ten   Stair Railings, Main Entrance railings safe and in good condition   Home Layout Other (see comments)  (Second floor apartment)   Equipment Currently Used at Home none   Readmission within 30 days? No   Patient currently being followed by outpatient case management? No   Do you currently have service(s) that help you manage your care at home? No   Do you take prescription medications? Yes   Do you have prescription coverage? Yes   Do you have any problems affording any of your prescribed medications? No   Is the patient taking medications as prescribed? yes   How do you get to doctors appointments? car, drives self;family or friend will provide   Are you on dialysis? No   Do you take coumadin? No   Discharge Plan A Home with family   Discharge Plan B Home Health   DME Needed Upon Discharge  none   Discharge Plan discussed with: Patient   Transition of Care Barriers None     Patient lives in a second floor apartment with adult son. Patient reports independent prior to admission. Denies any DME. PCP: Dr. Araujo, Pharmacy for medications upon DC: [facility]

## 2023-05-24 NOTE — NURSING
Nurses Note -- 4 Eyes      5/24/2023   5:26 AM      Skin assessed during: Admit      [x] No Altered Skin Integrity Present    []Prevention Measures Documented      [] Yes- Altered Skin Integrity Present or Discovered   [] LDA Added if Not in Epic (Describe Wound)   [] New Altered Skin Integrity was Present on Admit and Documented in LDA   [] Wound Image Taken    Wound Care Consulted? No    Attending Nurse:  Delvis Fierro RN     Second RN/Staff Member:  Alvina Tristan RN

## 2023-05-24 NOTE — CONSULTS
[facility] - 9th Floor Med Surg  Neurology  Consult Note    Patient Name: Sugar Johns  MRN: [id number]  Admission Date: 5/23/2023  Hospital Length of Stay: 1 days  Code Status: No Order   Attending Provider: Mckenna Cuellar MD   Consulting Provider: Sherron Pacheco [facility]  Primary Care Physician: Roberto Araujo MD  Principal Problem:<principal problem not specified>    Inpatient consult to Neurology  Consult performed by: YOANNA Jackman  Consult ordered by: Mckenna Cuellar MD         Subjective:     Chief Complaint:       HPI:   52 year old female with a past medical history of HTN, GERD, DM, and anxiety/depression presented to ED on 5/24 for weakness and dizziness x 2 days. She reports she started with dizziness and generalized weakness 2 days ago.  Patient describes dizziness as vertigo that increases with position changes and fixing her gaze on an object.  Patient reports associated left ear pain, congestion, and headache that starts in her sinuses and radiates around her head towards occipital region.  Denies syncopal episodes.      CT head was negative for acute intracranial abnormalities. CTA chest was negative for PE. UA significant for UTI.  Home medications reviewed.         Past Medical History:   Diagnosis Date    Anxiety disorder, unspecified     Depression     Diabetes mellitus     GERD (gastroesophageal reflux disease)     Hypertension        Past Surgical History:   Procedure Laterality Date    HYSTERECTOMY         Review of patient's allergies indicates:   Allergen Reactions    Hydrocodone     Meperidine      Other reaction(s): Panic attack    Ciprofloxacin Nausea Only    Gabapentin Anxiety    Sulfamethoxazole-trimethoprim Nausea Only       Current Neurological Medications:     No current facility-administered medications on file prior to encounter.     Current Outpatient Medications on File Prior to Encounter   Medication Sig    ALPRAZolam (XANAX) 1 MG tablet  Take 1 mg by mouth 2 (two) times daily as needed.    amLODIPine (NORVASC) 2.5 MG tablet Take 2.5 mg by mouth once daily.    esomeprazole (NEXIUM) 40 MG capsule Take 40 mg by mouth once daily.    fluticasone propionate (FLONASE) 50 mcg/actuation nasal spray 1 spray by Each Nostril route 2 (two) times daily.    hydrOXYzine (ATARAX) 50 MG tablet Take  mg by mouth nightly as needed.    levothyroxine (SYNTHROID) 50 MCG tablet Take 50 mcg by mouth once daily.    metFORMIN (GLUCOPHAGE-XR) 500 MG ER 24hr tablet Take 500 mg by mouth once daily.    olmesartan (BENICAR) 40 MG tablet Take 40 mg by mouth once daily.    propranoloL (INDERAL) 60 MG tablet Take 60 mg by mouth 2 (two) times daily.    rosuvastatin (CRESTOR) 20 MG tablet Take 20 mg by mouth once daily.    spironolactone (ALDACTONE) 25 MG tablet Take 25 mg by mouth once daily.    TRINTELLIX 20 mg Tab Take 1 tablet by mouth once daily.    URO--10-40.8-36 mg Cap Take 1 capsule by mouth daily as needed.     Family History    None       Tobacco Use    Smoking status: Former     Types: Cigarettes     Quit date: 2020     Years since quitting: 3.3    Smokeless tobacco: Never   Substance and Sexual Activity    Alcohol use: Not on file    Drug use: Never    Sexual activity: Not on file     Review of Systems  A 14pt ros was reviewed & is negative unless o/w documented in the hpi    Objective:     Vital Signs (Most Recent):  Temp: 98.2 °F (36.8 °C) (05/24/23 1106)  Pulse: 63 (05/24/23 1106)  Resp: 17 (05/24/23 1106)  BP: 119/82 (05/24/23 1125)  SpO2: 98 % (05/24/23 1106) Vital Signs (24h Range):  Temp:  [97.8 °F (36.6 °C)-98.2 °F (36.8 °C)] 98.2 °F (36.8 °C)  Pulse:  [] 63  Resp:  [14-18] 17  SpO2:  [94 %-99 %] 98 %  BP: (109-172)/(71-98) 119/82     Weight: 106.1 kg (234 lb)  Body mass index is 42.79 kg/m².     Physical Exam  Vitals reviewed.   Constitutional:       General: She is awake.      Appearance: Normal appearance. She is obese.   HENT:       Head: Normocephalic and atraumatic.      Nose: Nose normal.      Mouth/Throat:      Mouth: Mucous membranes are moist.   Eyes:      Extraocular Movements: Extraocular movements intact and EOM normal.      Pupils: Pupils are equal, round, and reactive to light.   Pulmonary:      Effort: Pulmonary effort is normal.   Musculoskeletal:         General: Normal range of motion.      Cervical back: Normal range of motion.   Skin:     General: Skin is warm and dry.   Neurological:      Mental Status: She is oriented to person, place, and time.      Coordination: Finger-Nose-Finger Test normal.   Psychiatric:         Mood and Affect: Mood normal.         Speech: Speech normal.         Behavior: Behavior is cooperative.         Thought Content: Thought content normal.         Judgment: Judgment normal.        NEUROLOGICAL EXAMINATION:     MENTAL STATUS   Oriented to person, place, and time.   Follows 3 step commands.   Attention: normal. Concentration: normal.   Speech: speech is normal   Level of consciousness: alert  Knowledge: good.   Normal comprehension.     CRANIAL NERVES     CN II   Visual fields full to confrontation.     CN III, IV, VI   Pupils are equal, round, and reactive to light.  Extraocular motions are normal.   Nystagmus: none   Conjugate gaze: present    CN V   Facial sensation intact.     CN VII   Facial expression full, symmetric.     MOTOR EXAM   Right arm pronator drift: absent  Left arm pronator drift: absent    Strength   Right deltoid: 5/5  Left deltoid: 5/5  Right biceps: 5/5  Left biceps: 5/5  Right triceps: 5/5  Left triceps: 5/5  Right quadriceps: 5/5  Left quadriceps: 5/5  Right hamstrin/5  Left hamstrin/5  Right anterior tibial: 5/5  Left anterior tibial: 5/5  Right posterior tibial: 5/5  Left posterior tibial: 5/5    SENSORY EXAM   Light touch normal.     GAIT AND COORDINATION      Coordination   Finger to nose coordination: normal    Significant Labs:   Recent Lab Results          05/24/23  0920   05/23/23  2105   05/23/23  1846   05/23/23  1702        Influenza A, Molecular       Not Detected       Influenza B, Molecular       Not Detected       Albumin/Globulin Ratio       1.3       Albumin       4.0       Alkaline Phosphatase       47       ALT       25       Ao peak arielle 1.66             Ao VTI 38.6             Appearance, UA     Clear         AST       24       AV mean gradient 6             AV index (prosthetic) 0.78             AV peak gradient 11             AV Velocity Ratio 0.77             Bacteria, UA     Trace         Baso #       0.04       Basophil %       0.5       BILIRUBIN TOTAL       0.8       Bilirubin, UA     Negative         BNP       22.4       BSA 2.15             BUN       13.7       Calcium       10.0       Chloride       103       CO2       21       Color, UA     Yellow         Creatinine       0.94       Left Ventricle Relative Wall Thickness 0.51             E/A ratio 0.82             E/E' ratio 6.72             eGFR       >60       EF 65             Eos #       0.03       Eosinophil %       0.3       E wave deceleration time 211.00             FS 33             Globulin, Total       3.1       Glucose       115       Glucose, UA     Negative         Hematocrit       38.0       Hemoglobin       13.2       Immature Grans (Abs)       0.03       Immature Granulocytes       0.3       IVSd 0.95             Ketones, UA     Negative         LA size 3.80             LA volume 44.30             LA Volume Index (Mod) 21.7             Leukocytes, UA     Trace         LV LATERAL E/E' RATIO 6.00             LV SEPTAL E/E' RATIO 7.64             LV EDV BP 94.90             LV Diastolic Volume Index 46.52             LVIDd 4.55             LVIDs 3.03             LV mass 168.01             LV Mass Index 82             Left Ventricular Outflow Tract Mean Gradient 3.00             Left Ventricular Outflow Tract Mean Velocity 0.84             LVOT peak arielle 1.27             LVOT peak  VTI 30.00             LV ESV BP 35.90             LV Systolic Volume Index 17.6             Lymph #       1.61       LYMPH %       18.5       Magnesium       1.80       MCH       31.3       MCHC       34.7       MCV       90.0       Mean e' 0.13             Mono #       0.52       Mono %       6.0       MPV       10.8       MV Peak A [place] 1.03             MV Peak E [place] 0.84             Neut #       6.47       Neut %       74.4       NITRITE UA     Negative         nRBC       0.0       Occult Blood UA     Negative         [place]'s Biplane MOD Ejection Fraction 6             pH, UA     5.5         Platelets       227       POCT Glucose   93           Potassium       4.3       PROTEIN TOTAL       7.1       Protein, UA     Negative         PV PEAK VELOCITY 1.01             Posterior Wall 1.16             RBC       4.22       RBC, UA     None Seen         RDW       12.0       RVDD 3.71             SARS-CoV2 (COVID-19) Qualitative PCR       Not Detected       Sodium       136       Specific Gravity,UA     1.010         Squam Epithel, UA     Few         TAPSE 1.78             TDI SEPTAL 0.11             TDI LATERAL 0.14             Thyroid Stimulating Hormone       2.430       Troponin I       <0.010       Urobilinogen, UA     0.2         WBC, UA     0-2         WBC       8.70               Significant Imaging:   CT head w/o 5/23/2023:  FINDINGS:  There is no intracranial mass or lesion seen.  No hemorrhage is seen.  No infarct is seen.  The ventricles and basilar cisterns appear normal.  Brain parenchyma appears grossly unremarkable.     Posterior fossa appears normal.  The calvarium is intact.  The paranasal sinuses appear grossly unremarkable.     Impression:     No acute abnormality seen    I have reviewed all pertinent imaging results/findings within the past 24 hours.    Assessment and Plan:     Dizziness  With associated HA    -MRI brain w w/o ordered  -HA cocktail: benadryl 25 mg IV, toradol 15 mg IV, and phenergan  12.5 mg IV PRN Q6h  -fall precautions  -consider ENT consult         VTE Risk Mitigation (From admission, onward)         Ordered     enoxaparin injection 40 mg  Every 24 hours         05/24/23 8356                Thank you for your consult. Further recommendations to follow per MD Sherron Pacheco, [facility]  Inpatient Neurology  [facility] - 9th Floor Med Surg

## 2023-05-24 NOTE — HPI
52 year old female with a past medical history of HTN, GERD, DM, and anxiety/depression presented to ED on 5/24 for weakness and dizziness x 2 days. She reports she started with dizziness and generalized weakness 2 days ago.  Patient describes dizziness as vertigo that increases with position changes and fixing her gaze on an object.  Patient reports associated left ear pain, congestion, and headache that starts in her sinuses and radiates around her head towards occipital region.  Denies syncopal episodes.      CT head was negative for acute intracranial abnormalities. CTA chest was negative for PE. UA significant for UTI.  Home medications reviewed.

## 2023-05-24 NOTE — ASSESSMENT & PLAN NOTE
With associated HA    -MRI brain w w/o ordered  -HA cocktail: benadryl 25 mg IV, toradol 15 mg IV, and phenergan 12.5 mg IV PRN Q6h  -fall precautions  -consider ENT consult

## 2023-05-25 LAB
ANION GAP SERPL CALC-SCNC: 5 MEQ/L
BUN SERPL-MCNC: 15 MG/DL (ref 9.8–20.1)
CALCIUM SERPL-MCNC: 8.7 MG/DL (ref 8.4–10.2)
CHLORIDE SERPL-SCNC: 106 MMOL/L (ref 98–107)
CO2 SERPL-SCNC: 22 MMOL/L (ref 22–29)
CREAT SERPL-MCNC: 0.96 MG/DL (ref 0.55–1.02)
CREAT/UREA NIT SERPL: 16
GFR SERPLBLD CREATININE-BSD FMLA CKD-EPI: >60 MLS/MIN/1.73/M2
GLUCOSE SERPL-MCNC: 80 MG/DL (ref 74–100)
POCT GLUCOSE: 66 MG/DL (ref 70–110)
POCT GLUCOSE: 70 MG/DL (ref 70–110)
POCT GLUCOSE: 71 MG/DL (ref 70–110)
POCT GLUCOSE: 76 MG/DL (ref 70–110)
POCT GLUCOSE: 83 MG/DL (ref 70–110)
POCT GLUCOSE: 85 MG/DL (ref 70–110)
POTASSIUM SERPL-SCNC: 4.1 MMOL/L (ref 3.5–5.1)
SODIUM SERPL-SCNC: 133 MMOL/L (ref 136–145)

## 2023-05-25 PROCEDURE — 63600175 PHARM REV CODE 636 W HCPCS: Performed by: INTERNAL MEDICINE

## 2023-05-25 PROCEDURE — 96361 HYDRATE IV INFUSION ADD-ON: CPT

## 2023-05-25 PROCEDURE — 80048 BASIC METABOLIC PNL TOTAL CA: CPT | Performed by: INTERNAL MEDICINE

## 2023-05-25 PROCEDURE — 27000221 HC OXYGEN, UP TO 24 HOURS

## 2023-05-25 PROCEDURE — 96376 TX/PRO/DX INJ SAME DRUG ADON: CPT

## 2023-05-25 PROCEDURE — 96365 THER/PROPH/DIAG IV INF INIT: CPT

## 2023-05-25 PROCEDURE — G0378 HOSPITAL OBSERVATION PER HR: HCPCS

## 2023-05-25 PROCEDURE — 96372 THER/PROPH/DIAG INJ SC/IM: CPT | Mod: 59 | Performed by: INTERNAL MEDICINE

## 2023-05-25 PROCEDURE — 25000003 PHARM REV CODE 250: Performed by: INTERNAL MEDICINE

## 2023-05-25 PROCEDURE — 94761 N-INVAS EAR/PLS OXIMETRY MLT: CPT

## 2023-05-25 PROCEDURE — 63600175 PHARM REV CODE 636 W HCPCS

## 2023-05-25 PROCEDURE — 97161 PT EVAL LOW COMPLEX 20 MIN: CPT

## 2023-05-25 PROCEDURE — 96375 TX/PRO/DX INJ NEW DRUG ADDON: CPT

## 2023-05-25 RX ORDER — HYDROCHLOROTHIAZIDE 12.5 MG/1
12.5 TABLET ORAL DAILY
Status: DISCONTINUED | OUTPATIENT
Start: 2023-05-26 | End: 2023-05-28 | Stop reason: HOSPADM

## 2023-05-25 RX ORDER — ROSUVASTATIN CALCIUM 20 MG/1
20 TABLET, COATED ORAL NIGHTLY
Status: DISCONTINUED | OUTPATIENT
Start: 2023-05-25 | End: 2023-05-28 | Stop reason: HOSPADM

## 2023-05-25 RX ORDER — DIPHENHYDRAMINE HYDROCHLORIDE 50 MG/ML
25 INJECTION INTRAMUSCULAR; INTRAVENOUS ONCE
Status: COMPLETED | OUTPATIENT
Start: 2023-05-25 | End: 2023-05-25

## 2023-05-25 RX ORDER — FUROSEMIDE 10 MG/ML
20 INJECTION INTRAMUSCULAR; INTRAVENOUS ONCE
Status: DISCONTINUED | OUTPATIENT
Start: 2023-05-25 | End: 2023-05-26

## 2023-05-25 RX ORDER — ONDANSETRON 2 MG/ML
4 INJECTION INTRAMUSCULAR; INTRAVENOUS ONCE
Status: COMPLETED | OUTPATIENT
Start: 2023-05-25 | End: 2023-05-25

## 2023-05-25 RX ORDER — ACETAMINOPHEN 325 MG/1
650 TABLET ORAL EVERY 6 HOURS PRN
Status: DISCONTINUED | OUTPATIENT
Start: 2023-05-25 | End: 2023-05-28 | Stop reason: HOSPADM

## 2023-05-25 RX ORDER — KETOROLAC TROMETHAMINE 30 MG/ML
15 INJECTION, SOLUTION INTRAMUSCULAR; INTRAVENOUS ONCE
Status: COMPLETED | OUTPATIENT
Start: 2023-05-25 | End: 2023-05-25

## 2023-05-25 RX ORDER — SPIRONOLACTONE 25 MG/1
25 TABLET ORAL DAILY
Status: DISCONTINUED | OUTPATIENT
Start: 2023-05-26 | End: 2023-05-25

## 2023-05-25 RX ORDER — PROPRANOLOL HYDROCHLORIDE 20 MG/1
20 TABLET ORAL 2 TIMES DAILY
Status: DISCONTINUED | OUTPATIENT
Start: 2023-05-25 | End: 2023-05-27

## 2023-05-25 RX ORDER — BUTALBITAL, ACETAMINOPHEN AND CAFFEINE 50; 325; 40 MG/1; MG/1; MG/1
1 TABLET ORAL EVERY 4 HOURS PRN
Status: DISCONTINUED | OUTPATIENT
Start: 2023-05-25 | End: 2023-05-25

## 2023-05-25 RX ADMIN — ONDANSETRON 4 MG: 2 INJECTION INTRAMUSCULAR; INTRAVENOUS at 11:05

## 2023-05-25 RX ADMIN — KETOROLAC TROMETHAMINE 15 MG: 30 INJECTION, SOLUTION INTRAMUSCULAR; INTRAVENOUS at 11:05

## 2023-05-25 RX ADMIN — DIPHENHYDRAMINE HYDROCHLORIDE 25 MG: 50 INJECTION INTRAMUSCULAR; INTRAVENOUS at 11:05

## 2023-05-25 RX ADMIN — DIPHENHYDRAMINE HYDROCHLORIDE 25 MG: 50 INJECTION INTRAMUSCULAR; INTRAVENOUS at 08:05

## 2023-05-25 RX ADMIN — PROPRANOLOL HYDROCHLORIDE 20 MG: 20 TABLET ORAL at 08:05

## 2023-05-25 RX ADMIN — ALPRAZOLAM 1 MG: 1 TABLET, EXTENDED RELEASE ORAL at 08:05

## 2023-05-25 RX ADMIN — ACETAMINOPHEN 650 MG: 325 TABLET ORAL at 05:05

## 2023-05-25 RX ADMIN — ONDANSETRON 4 MG: 2 INJECTION INTRAMUSCULAR; INTRAVENOUS at 08:05

## 2023-05-25 RX ADMIN — LEVOTHYROXINE SODIUM 50 MCG: 50 TABLET ORAL at 08:05

## 2023-05-25 RX ADMIN — KETOROLAC TROMETHAMINE 15 MG: 30 INJECTION, SOLUTION INTRAMUSCULAR; INTRAVENOUS at 05:05

## 2023-05-25 RX ADMIN — ROSUVASTATIN CALCIUM 20 MG: 20 TABLET, FILM COATED ORAL at 08:05

## 2023-05-25 RX ADMIN — SODIUM CHLORIDE: 9 INJECTION, SOLUTION INTRAVENOUS at 05:05

## 2023-05-25 RX ADMIN — ALPRAZOLAM 0.25 MG: 0.25 TABLET ORAL at 09:05

## 2023-05-25 RX ADMIN — CEFTRIAXONE SODIUM 1 G: 1 INJECTION, POWDER, FOR SOLUTION INTRAMUSCULAR; INTRAVENOUS at 05:05

## 2023-05-25 RX ADMIN — ONDANSETRON 4 MG: 2 INJECTION INTRAMUSCULAR; INTRAVENOUS at 05:05

## 2023-05-25 RX ADMIN — ALPRAZOLAM 1 MG: 1 TABLET, EXTENDED RELEASE ORAL at 09:05

## 2023-05-25 RX ADMIN — DIPHENHYDRAMINE HYDROCHLORIDE 25 MG: 50 INJECTION INTRAMUSCULAR; INTRAVENOUS at 05:05

## 2023-05-25 RX ADMIN — ENOXAPARIN SODIUM 40 MG: 40 INJECTION SUBCUTANEOUS at 05:05

## 2023-05-25 RX ADMIN — AMLODIPINE BESYLATE 2.5 MG: 2.5 TABLET ORAL at 08:05

## 2023-05-25 RX ADMIN — VORTIOXETINE 20 MG: 20 TABLET, FILM COATED ORAL at 02:05

## 2023-05-25 NOTE — PROGRESS NOTES
[facility] Hospital Medicine Progress Note        Chief Complaint: Inpatient Follow-up for dizziness and headache     HPI:   Patient is a 52 y.o. female with a medical history of anxiety/depression, hypertension, gastroesophageal reflux disease and type 2 diabetes mellitus presented with complaint of weakness and dizziness over the past 2 days.    Patient has been at her usual state of health until about 2 days ago when she developed progressive generalized body weakness associated with dizziness and almost passing out.  She denies any fever.  She also admits to sore throat and mild shortness of breaths.  No chest pain.  No fever. She decided to come to the ER further evaluation.    At the ER, CT of the brain shows no acute findings.  CTA of the chest was negative for PE or pneumonia.  Blood pressure has been relatively low. On multiple antihypertensives at home.       Interval Hx:   Pt reports dizziness and headaches are better with migraine  cocktail ordered by Neuro , however not resolved and asking if she can get another dose of cocktail. C/O feeling swollen all over from getting all the IVF since came to the ED. Worried that she has not taken her Spironolactone as it was held by MD.     Vitals are reviewed . BP is rising . Will stop IVF and give Lasix 20 mg IV x 1 dose   Neurology ordered MRI brain and it is not done yet.      Case was discussed with patient's nurse and  on the floor.    Objective/physical exam:  General: In no acute distress, afebrile  Chest: Clear to auscultation bilaterally  Heart: RRR, +S1, S2, no appreciable murmur  Abdomen: Soft, nontender, BS +  MSK: Warm, no lower extremity edema, no clubbing or cyanosis  Neurologic: Alert and oriented x4, Cranial nerve II-XII intact, Strength 5/5 in all 4 extremities    VITAL SIGNS: 24 HRS MIN & MAX LAST   Temp  Min: 97.5 °F (36.4 °C)  Max: 98.5 °F (36.9 °C) 98.3 °F (36.8 °C)   BP  Min: 119/75  Max: 142/82 (!)  142/82   Pulse  Min: 57  Max: 81  80   Resp  Min: 10  Max: 18 16   SpO2  Min: 96 %  Max: 100 % 97 %     I have reviewed the following labs:    Recent Labs   Lab 05/23/23  1702   WBC 8.70   RBC 4.22   HGB 13.2   HCT 38.0   MCV 90.0   MCH 31.3*   MCHC 34.7   RDW 12.0      MPV 10.8*       Recent Labs   Lab 05/23/23  1702 05/25/23  1240    133*   K 4.3 4.1   CO2 21* 22   BUN 13.7 15.0   CREATININE 0.94 0.96   CALCIUM 10.0 8.7   MG 1.80  --    ALBUMIN 4.0  --    ALKPHOS 47  --    ALT 25  --    AST 24  --    BILITOT 0.8  --           Microbiology Results (last 7 days)       Procedure Component Value Units Date/Time    Urine Culture High Risk [[id number]]     Order Status: Sent Specimen: Urine, Clean Catch              See below for Radiology    Scheduled Med:   ALPRAZolam  1 mg Oral Q12H    amLODIPine  2.5 mg Oral Daily    enoxparin  40 mg Subcutaneous Q24H (prophylaxis, 1700)    fluticasone propionate  1 spray Each Nostril BID    furosemide (LASIX) injection  20 mg Intravenous Once    levothyroxine  50 mcg Oral Daily    pantoprazole  40 mg Oral Daily    rosuvastatin  20 mg Oral QHS    [START ON 5/26/2023] vortioxetine  1 tablet Oral QHS        Continuous Infusions:       PRN Meds:  acetaminophen, ALPRAZolam, dextrose 10%, dextrose 10%, dextrose 50%, diphenhydrAMINE, docusate sodium, glucagon (human recombinant), glucose, glucose, hydrOXYzine, insulin aspart U-100, lorazepam, ondansetron, promethazine       Assessment/Plan:  Vertiginous Migraine   Generalized weakness   Essential HTN  Anxiety /Depression   DM, type 2  Hypothyroidism   Hyperlipidemia     Plan-  Stop IVF  Lasix 20 mg IV x 1 today   Ok to resume home Propranolol at lower dose( takes for anxiety disorder)  and Spirolactone.   Hold Benicar ( home med) until /90   Stop Rocephin. No evidence of UTI  Follow up MRI brain   PT/OT consult   Follow up on further Neuro recs      Echo is unremarkable with LVEF 65%    Addendum-  Pt opted not to be back  on Spirolactone and wants to take HCTZ  which she used to take before Spironolactone.     Pt repeatedly asking for migraine cocktail. Offered Fioricet, however report she can't take Caffeine product.        VTE prophylaxis: Lovenox     Patient condition:  Stable    Anticipated discharge and Disposition:     Home with family       All diagnosis and differential diagnosis have been reviewed; assessment and plan has been documented; I have personally reviewed the labs and test results that are presently available; I have reviewed the patients medication list; I have reviewed the consulting providers response and recommendations. I have reviewed or attempted to review medical records based upon their availability    All of the patient's questions have been  addressed and answered. Patient's is agreeable to the above stated plan. I will continue to monitor closely and make adjustments to medical management as needed.  _____________________________________________________________________    Nutrition Status:    Radiology:  I have personally reviewed the following imaging and agree with the radiologist.     Echo  · Normal systolic function.  · The estimated ejection fraction is 65%.  · Normal left ventricular diastolic function.  · Normal right ventricular size with normal right ventricular systolic   function.  · Mild mitral regurgitation.  · Mild tricuspid regurgitation.         Topher Lombardo MD   05/25/2023

## 2023-05-25 NOTE — PT/OT/SLP EVAL
Physical Therapy Evaluation and Discharge Note    Patient Name:  Sugar Johns   MRN:  [id number]    Recommendations:     Discharge Recommendations: home  Discharge Equipment Recommendations: none   Barriers to discharge: Ongoing medical needs    Assessment:     Sugar Johns is a 52 y.o. female admitted with a medical diagnosis of dizziness . .  At this time, patient is functioning at their prior level of function and does not require further acute PT services.     Recent Surgery: * No surgery found *      Plan:     During this hospitalization, patient does not require further acute PT services.  Please re-consult if situation changes.      Subjective     Chief Complaint: intermittent dizziness with mobility  Patient/Family Comments/goals: return home  Pain/Comfort:  Pain Rating 1: 0/10  Pain Rating Post-Intervention 1: 0/10    Patients cultural, spiritual, Pentec[employer]al conflicts given the current situation:      Living Environment:  Pt lives in second floor apartment, son is currently staying with her. She was Independent of ADLs and IADLs and denies use of assistive devices.  Prior to admission, patients level of function was Independent.  Equipment used at home: none.  DME owned (not currently used): none.  Upon discharge, patient will have assistance from family.    Objective:     Communicated with nurse prior to session.  Patient found HOB elevated with blood pressure cuff, peripheral IV upon PT entry to room.    General Precautions: Standard,      Orthopedic Precautions:N/A   Braces: N/A  Respiratory Status: Nasal cannula, flow 2 L/min. MD present at beginning of eval and wanted to wean to RA. Pt anxious about removal of supplemental O2. Sats 100% on NC and eventually able to remove NC and remained at 100%  Blood Pressure: 124/79- semi supine          133/75- sitting EOB          139/83- stand    Pt very anxious about monitoring and knowing vitals when assessed.     Exams:  Cognitive Exam:  Patient  is oriented to Person, Place, Time, and Situation  Gross Motor Coordination:  WFL  RLE ROM: WFL  RLE Strength: WFL  LLE ROM: WFL  LLE Strength: WFL    Functional Mobility:  Bed Mobility:     Rolling Left:  independence  Supine to Sit: independence  Transfers:     Sit to Stand:  independence with no AD  Gait: 300ft Independent without AD. Pt completing head turns and changes in direction without LOB or deviation in path.  Balance: good. No deviation with moderate challenges. No specific complaints of increasing s/s with any challenge presented    -PAC 6 CLICK MOBILITY  Total Score:24       Treatment and Education:  Patient and family provided with verbal education regarding importance of mobility.  Understanding was verbalized.     Patient left up in chair with all lines intact and call button in reach.    GOALS:   Multidisciplinary Problems       Physical Therapy Goals       Not on file                    History:     Past Medical History:   Diagnosis Date    Anxiety disorder, unspecified     Depression     Diabetes mellitus     GERD (gastroesophageal reflux disease)     Hypertension        Past Surgical History:   Procedure Laterality Date    HYSTERECTOMY         Time Tracking:     PT Received On: 05/25/23  PT Start Time: 1126     PT Stop Time: 1151  PT Total Time (min): 25 min     Billable Minutes: Evaluation low      05/25/2023

## 2023-05-26 LAB
ANION GAP SERPL CALC-SCNC: 11 MEQ/L
BUN SERPL-MCNC: 10.3 MG/DL (ref 9.8–20.1)
CALCIUM SERPL-MCNC: 9.2 MG/DL (ref 8.4–10.2)
CHLORIDE SERPL-SCNC: 102 MMOL/L (ref 98–107)
CO2 SERPL-SCNC: 21 MMOL/L (ref 22–29)
CREAT SERPL-MCNC: 0.94 MG/DL (ref 0.55–1.02)
CREAT/UREA NIT SERPL: 11
GFR SERPLBLD CREATININE-BSD FMLA CKD-EPI: >60 MLS/MIN/1.73/M2
GLUCOSE SERPL-MCNC: 74 MG/DL (ref 74–100)
MAGNESIUM SERPL-MCNC: 1.8 MG/DL (ref 1.6–2.6)
PHOSPHATE SERPL-MCNC: 3.4 MG/DL (ref 2.3–4.7)
POCT GLUCOSE: 105 MG/DL (ref 70–110)
POCT GLUCOSE: 68 MG/DL (ref 70–110)
POCT GLUCOSE: 82 MG/DL (ref 70–110)
POCT GLUCOSE: 90 MG/DL (ref 70–110)
POTASSIUM SERPL-SCNC: 3.8 MMOL/L (ref 3.5–5.1)
SODIUM SERPL-SCNC: 134 MMOL/L (ref 136–145)

## 2023-05-26 PROCEDURE — 25000003 PHARM REV CODE 250: Performed by: INTERNAL MEDICINE

## 2023-05-26 PROCEDURE — 63600175 PHARM REV CODE 636 W HCPCS: Performed by: INTERNAL MEDICINE

## 2023-05-26 PROCEDURE — 96376 TX/PRO/DX INJ SAME DRUG ADON: CPT

## 2023-05-26 PROCEDURE — 96372 THER/PROPH/DIAG INJ SC/IM: CPT | Performed by: INTERNAL MEDICINE

## 2023-05-26 PROCEDURE — G0378 HOSPITAL OBSERVATION PER HR: HCPCS

## 2023-05-26 PROCEDURE — 83735 ASSAY OF MAGNESIUM: CPT | Performed by: STUDENT IN AN ORGANIZED HEALTH CARE EDUCATION/TRAINING PROGRAM

## 2023-05-26 PROCEDURE — 63600175 PHARM REV CODE 636 W HCPCS

## 2023-05-26 PROCEDURE — 25000003 PHARM REV CODE 250: Performed by: STUDENT IN AN ORGANIZED HEALTH CARE EDUCATION/TRAINING PROGRAM

## 2023-05-26 PROCEDURE — 84100 ASSAY OF PHOSPHORUS: CPT | Performed by: STUDENT IN AN ORGANIZED HEALTH CARE EDUCATION/TRAINING PROGRAM

## 2023-05-26 PROCEDURE — 80048 BASIC METABOLIC PNL TOTAL CA: CPT | Performed by: STUDENT IN AN ORGANIZED HEALTH CARE EDUCATION/TRAINING PROGRAM

## 2023-05-26 RX ORDER — KETOROLAC TROMETHAMINE 30 MG/ML
30 INJECTION, SOLUTION INTRAMUSCULAR; INTRAVENOUS ONCE
Status: COMPLETED | OUTPATIENT
Start: 2023-05-26 | End: 2023-05-27

## 2023-05-26 RX ORDER — CETIRIZINE HYDROCHLORIDE 10 MG/1
10 TABLET ORAL NIGHTLY
Status: DISCONTINUED | OUTPATIENT
Start: 2023-05-26 | End: 2023-05-28 | Stop reason: HOSPADM

## 2023-05-26 RX ADMIN — ENOXAPARIN SODIUM 40 MG: 40 INJECTION SUBCUTANEOUS at 04:05

## 2023-05-26 RX ADMIN — AMLODIPINE BESYLATE 2.5 MG: 2.5 TABLET ORAL at 10:05

## 2023-05-26 RX ADMIN — DIPHENHYDRAMINE HYDROCHLORIDE 25 MG: 50 INJECTION INTRAMUSCULAR; INTRAVENOUS at 04:05

## 2023-05-26 RX ADMIN — ALPRAZOLAM 0.25 MG: 0.25 TABLET ORAL at 10:05

## 2023-05-26 RX ADMIN — ACETAMINOPHEN 650 MG: 325 TABLET ORAL at 12:05

## 2023-05-26 RX ADMIN — HYDROCHLOROTHIAZIDE 12.5 MG: 12.5 TABLET ORAL at 10:05

## 2023-05-26 RX ADMIN — PROPRANOLOL HYDROCHLORIDE 20 MG: 20 TABLET ORAL at 10:05

## 2023-05-26 RX ADMIN — ONDANSETRON 4 MG: 2 INJECTION INTRAMUSCULAR; INTRAVENOUS at 04:05

## 2023-05-26 RX ADMIN — LEVOTHYROXINE SODIUM 50 MCG: 50 TABLET ORAL at 10:05

## 2023-05-26 RX ADMIN — ACETAMINOPHEN 650 MG: 325 TABLET ORAL at 07:05

## 2023-05-26 RX ADMIN — PANTOPRAZOLE SODIUM 40 MG: 40 TABLET, DELAYED RELEASE ORAL at 10:05

## 2023-05-26 RX ADMIN — ONDANSETRON 4 MG: 2 INJECTION INTRAMUSCULAR; INTRAVENOUS at 07:05

## 2023-05-26 RX ADMIN — ALPRAZOLAM 1 MG: 1 TABLET, EXTENDED RELEASE ORAL at 10:05

## 2023-05-26 RX ADMIN — ONDANSETRON 4 MG: 2 INJECTION INTRAMUSCULAR; INTRAVENOUS at 12:05

## 2023-05-26 RX ADMIN — ACETAMINOPHEN 650 MG: 325 TABLET ORAL at 04:05

## 2023-05-26 RX ADMIN — DIPHENHYDRAMINE HYDROCHLORIDE 25 MG: 50 INJECTION INTRAMUSCULAR; INTRAVENOUS at 12:05

## 2023-05-26 RX ADMIN — ALPRAZOLAM 1 MG: 1 TABLET, EXTENDED RELEASE ORAL at 11:05

## 2023-05-26 RX ADMIN — CETIRIZINE HYDROCHLORIDE 10 MG: 10 TABLET, FILM COATED ORAL at 10:05

## 2023-05-26 RX ADMIN — FLUTICASONE PROPIONATE 50 MCG: 50 SPRAY, METERED NASAL at 10:05

## 2023-05-26 RX ADMIN — VORTIOXETINE 20 MG: 20 TABLET, FILM COATED ORAL at 10:05

## 2023-05-26 RX ADMIN — DIPHENHYDRAMINE HYDROCHLORIDE 25 MG: 50 INJECTION INTRAMUSCULAR; INTRAVENOUS at 07:05

## 2023-05-26 RX ADMIN — ROSUVASTATIN CALCIUM 20 MG: 20 TABLET, FILM COATED ORAL at 10:05

## 2023-05-27 LAB
POCT GLUCOSE: 61 MG/DL (ref 70–110)
POCT GLUCOSE: 87 MG/DL (ref 70–110)
TROPONIN I SERPL-MCNC: <0.01 NG/ML (ref 0–0.04)

## 2023-05-27 PROCEDURE — G0378 HOSPITAL OBSERVATION PER HR: HCPCS

## 2023-05-27 PROCEDURE — 25500020 PHARM REV CODE 255: Performed by: STUDENT IN AN ORGANIZED HEALTH CARE EDUCATION/TRAINING PROGRAM

## 2023-05-27 PROCEDURE — 93005 ELECTROCARDIOGRAM TRACING: CPT

## 2023-05-27 PROCEDURE — 25000003 PHARM REV CODE 250: Performed by: INTERNAL MEDICINE

## 2023-05-27 PROCEDURE — 96375 TX/PRO/DX INJ NEW DRUG ADDON: CPT | Mod: 59

## 2023-05-27 PROCEDURE — A9577 INJ MULTIHANCE: HCPCS | Performed by: STUDENT IN AN ORGANIZED HEALTH CARE EDUCATION/TRAINING PROGRAM

## 2023-05-27 PROCEDURE — 63600175 PHARM REV CODE 636 W HCPCS: Performed by: NURSE PRACTITIONER

## 2023-05-27 PROCEDURE — 96372 THER/PROPH/DIAG INJ SC/IM: CPT | Performed by: INTERNAL MEDICINE

## 2023-05-27 PROCEDURE — 93010 EKG 12-LEAD: ICD-10-PCS | Mod: ,,, | Performed by: INTERNAL MEDICINE

## 2023-05-27 PROCEDURE — 84484 ASSAY OF TROPONIN QUANT: CPT | Performed by: NURSE PRACTITIONER

## 2023-05-27 PROCEDURE — 25000003 PHARM REV CODE 250: Performed by: STUDENT IN AN ORGANIZED HEALTH CARE EDUCATION/TRAINING PROGRAM

## 2023-05-27 PROCEDURE — 99232 PR SUBSEQUENT HOSPITAL CARE,LEVL II: ICD-10-PCS | Mod: ,,, | Performed by: SPECIALIST

## 2023-05-27 PROCEDURE — 93010 ELECTROCARDIOGRAM REPORT: CPT | Mod: ,,, | Performed by: INTERNAL MEDICINE

## 2023-05-27 PROCEDURE — 63600175 PHARM REV CODE 636 W HCPCS

## 2023-05-27 PROCEDURE — 96376 TX/PRO/DX INJ SAME DRUG ADON: CPT | Mod: 59

## 2023-05-27 PROCEDURE — 63600175 PHARM REV CODE 636 W HCPCS: Performed by: INTERNAL MEDICINE

## 2023-05-27 PROCEDURE — 96374 THER/PROPH/DIAG INJ IV PUSH: CPT | Mod: 59

## 2023-05-27 PROCEDURE — 63600175 PHARM REV CODE 636 W HCPCS: Performed by: STUDENT IN AN ORGANIZED HEALTH CARE EDUCATION/TRAINING PROGRAM

## 2023-05-27 PROCEDURE — 99232 SBSQ HOSP IP/OBS MODERATE 35: CPT | Mod: ,,, | Performed by: SPECIALIST

## 2023-05-27 RX ORDER — POLYETHYLENE GLYCOL 3350 17 G/17G
17 POWDER, FOR SOLUTION ORAL DAILY
Status: DISCONTINUED | OUTPATIENT
Start: 2023-05-27 | End: 2023-05-28 | Stop reason: HOSPADM

## 2023-05-27 RX ORDER — PROPRANOLOL HYDROCHLORIDE 20 MG/1
20 TABLET ORAL 2 TIMES DAILY
Status: DISCONTINUED | OUTPATIENT
Start: 2023-05-27 | End: 2023-05-28 | Stop reason: HOSPADM

## 2023-05-27 RX ADMIN — HYDROCHLOROTHIAZIDE 12.5 MG: 12.5 TABLET ORAL at 10:05

## 2023-05-27 RX ADMIN — ALPRAZOLAM 1 MG: 1 TABLET, EXTENDED RELEASE ORAL at 09:05

## 2023-05-27 RX ADMIN — PANTOPRAZOLE SODIUM 40 MG: 40 TABLET, DELAYED RELEASE ORAL at 10:05

## 2023-05-27 RX ADMIN — AMLODIPINE BESYLATE 2.5 MG: 2.5 TABLET ORAL at 10:05

## 2023-05-27 RX ADMIN — DIPHENHYDRAMINE HYDROCHLORIDE 25 MG: 50 INJECTION INTRAMUSCULAR; INTRAVENOUS at 08:05

## 2023-05-27 RX ADMIN — ROSUVASTATIN CALCIUM 20 MG: 20 TABLET, FILM COATED ORAL at 08:05

## 2023-05-27 RX ADMIN — ONDANSETRON 4 MG: 2 INJECTION INTRAMUSCULAR; INTRAVENOUS at 08:05

## 2023-05-27 RX ADMIN — ENOXAPARIN SODIUM 40 MG: 40 INJECTION SUBCUTANEOUS at 05:05

## 2023-05-27 RX ADMIN — ACETAMINOPHEN 650 MG: 325 TABLET ORAL at 08:05

## 2023-05-27 RX ADMIN — LORAZEPAM 1 MG: 2 INJECTION INTRAMUSCULAR; INTRAVENOUS at 10:05

## 2023-05-27 RX ADMIN — DOCUSATE SODIUM 50 MG: 50 CAPSULE, LIQUID FILLED ORAL at 08:05

## 2023-05-27 RX ADMIN — ALPRAZOLAM 0.25 MG: 0.25 TABLET ORAL at 06:05

## 2023-05-27 RX ADMIN — ALPRAZOLAM 1 MG: 1 TABLET, EXTENDED RELEASE ORAL at 08:05

## 2023-05-27 RX ADMIN — POLYETHYLENE GLYCOL 3350 17 G: 17 POWDER, FOR SOLUTION ORAL at 12:05

## 2023-05-27 RX ADMIN — LEVOTHYROXINE SODIUM 50 MCG: 50 TABLET ORAL at 10:05

## 2023-05-27 RX ADMIN — ONDANSETRON 4 MG: 2 INJECTION INTRAMUSCULAR; INTRAVENOUS at 01:05

## 2023-05-27 RX ADMIN — GADOBENATE DIMEGLUMINE 20 ML: 529 INJECTION, SOLUTION INTRAVENOUS at 11:05

## 2023-05-27 RX ADMIN — ACETAMINOPHEN 650 MG: 325 TABLET ORAL at 07:05

## 2023-05-27 RX ADMIN — DIPHENHYDRAMINE HYDROCHLORIDE 25 MG: 50 INJECTION INTRAMUSCULAR; INTRAVENOUS at 01:05

## 2023-05-27 RX ADMIN — KETOROLAC TROMETHAMINE 30 MG: 30 INJECTION, SOLUTION INTRAMUSCULAR; INTRAVENOUS at 01:05

## 2023-05-27 RX ADMIN — CETIRIZINE HYDROCHLORIDE 10 MG: 10 TABLET, FILM COATED ORAL at 08:05

## 2023-05-27 RX ADMIN — PROPRANOLOL HYDROCHLORIDE 20 MG: 20 TABLET ORAL at 10:05

## 2023-05-27 RX ADMIN — FLUTICASONE PROPIONATE 50 MCG: 50 SPRAY, METERED NASAL at 12:05

## 2023-05-27 RX ADMIN — VORTIOXETINE 20 MG: 20 TABLET, FILM COATED ORAL at 08:05

## 2023-05-27 RX ADMIN — PROPRANOLOL HYDROCHLORIDE 20 MG: 20 TABLET ORAL at 08:05

## 2023-05-27 NOTE — PROGRESS NOTES
[facility] Hospital Medicine Progress Note      Chief Complaint: dizziness and headache     HPI:   52 y.o. female with a medical history of anxiety/depression, hypertension, gastroesophageal reflux disease and type 2 diabetes mellitus presented with complaint of weakness and dizziness over the past 2 days.    Patient has been at her usual state of health until about 2 days ago when she developed progressive generalized body weakness associated with dizziness and almost passing out.  She denies any fever.  She also admits to sore throat and mild shortness of breaths.  No chest pain.  No fever. She decided to come to the ER further evaluation.    At the ER, CT of the brain shows no acute findings.  CTA of the chest was negative for PE or pneumonia.  Blood pressure has been relatively low. On multiple antihypertensives at home.      Interval Hx:   Patient still complains of dizziness and is requesting IV fluid hydration, I explain to patient the indications for such; will check orthostatics.    Objective/physical exam:  General: Appears comfortable, no acute distress.  Integumentary: Warm, dry, intact.    Musculoskeletal: Purposeful movement noted.   Respiratory: No accessory muscle use. Breath sounds are equal.  Cardiovascular: Regular rate. No peripheral edema.    VITAL SIGNS: 24 HRS MIN & MAX LAST   Temp  Min: 97.8 °F (36.6 °C)  Max: 98.6 °F (37 °C) 97.8 °F (36.6 °C)   BP  Min: 150/90  Max: 161/85 (!) 152/90   Pulse  Min: 59  Max: 75  75   Resp  Min: 16  Max: 18 16   SpO2  Min: 96 %  Max: 99 % 97 %     X-Ray Sinuses Min 3 Views  Narrative: EXAMINATION:  XR SINUSES MIN 3 VIEWS    CLINICAL HISTORY:  tenderness, sinusitis;    TECHNIQUE:  AP, lateral, and Edwards views of the paranasal sinuses were performed    COMPARISON:  None.    FINDINGS:  The visualized portion of the paranasal sinuses appear normal.  No air-fluid levels are seen.  No masses are seen.  No obvious fracture seen.  Orbits  appear grossly unremarkable.  Nasal bones intact.  Impression: No evidence of abnormality seen in the paranasal sinuses    Electronically signed by: Neisha Dawson  Date:    05/26/2023  Time:    18:17    Recent Labs   Lab 05/23/23  1702   WBC 8.70   RBC 4.22   HGB 13.2   HCT 38.0   MCV 90.0   MCH 31.3*   MCHC 34.7   RDW 12.0      MPV 10.8*       Recent Labs   Lab 05/23/23  1702 05/25/23  1240 05/26/23  1659    133* 134*   K 4.3 4.1 3.8   CO2 21* 22 21*   BUN 13.7 15.0 10.3   CREATININE 0.94 0.96 0.94   CALCIUM 10.0 8.7 9.2   MG 1.80  --  1.80   ALBUMIN 4.0  --   --    ALKPHOS 47  --   --    ALT 25  --   --    AST 24  --   --    BILITOT 0.8  --   --           Microbiology Results (last 7 days)       Procedure Component Value Units Date/Time    Urine Culture High Risk [[id number]]     Order Status: Sent Specimen: Urine, Clean Catch              See below for Radiology    Scheduled Med:   ALPRAZolam  1 mg Oral Q12H    amLODIPine  2.5 mg Oral Daily    cetirizine  10 mg Oral QHS    enoxparin  40 mg Subcutaneous Q24H (prophylaxis, 1700)    fluticasone propionate  1 spray Each Nostril BID    hydroCHLOROthiazide  12.5 mg Oral Daily    levothyroxine  50 mcg Oral Daily    pantoprazole  40 mg Oral Daily    rosuvastatin  20 mg Oral QHS    vortioxetine  1 tablet Oral QHS        Continuous Infusions:       PRN Meds:  acetaminophen, dextrose 10%, dextrose 10%, dextrose 50%, docusate sodium, glucagon (human recombinant), glucose, glucose, hydrOXYzine, insulin aspart U-100, lorazepam, ondansetron, promethazine     Nutrition Status:      Assessment/Plan:  Dizziness  Migraine  Generalized weakness   Essential HTN  Anxiety /Depression   DM, type 2  Hypothyroidism   Hyperlipidemia   -----------------------------------------------------------------------------------    Patient takes a set schedule Xanax daily and there is also p.r.n. Xanax added.  Discontinue p.r.n. Xanax.    Consider discontinuation of  propanolol as  this along with other medications can certainly be contributing to her dizziness-  MRI--- within normal limits;neurologist has seen patient; no further recommendations other than medication adjustments.    X-ray of her sinuses showed no acute findings.  Continue to treat allergy symptoms as such.    Will re-check orthostatics    Consults neurology    Anticipated discharge and Disposition:  Pending.    All diagnosis and differential diagnosis have been reviewed,  interpreted and communicated appropriately to care team. assessment and plan has been documented; I have personally reviewed the labs and test results that are presently available and pertinent to this hospital course; I have reviewed medical records based upon their availability.    I will continue to monitor closely and make adjustments to medical management as needed.    Nesha Cruz,    05/27/2023        This note was created with the assistance of Dragon voice recognition software. There may be transcription errors as a result of using this technology however minimal. Effort has been made to assure accuracy of transcription but any obvious errors or omissions should be clarified with the author of the document.

## 2023-05-27 NOTE — PROGRESS NOTES
Neurology inpt follow up note    Patient Name: Sugar Johns  MRN: [id number]  Admission Date: 5/23/2023  Hospital Length of Stay: 1 days  Consulting Provider: Saurabh Pope MD  Primary Care Physician: Roberto Araujo MD  Principal Problem:<principal problem not specified>      Subjective:     Chief Complaint:    Chief Complaint   Patient presents with    Weakness     Complains of weakness and dizziness       HPI:   52 year old female with a past medical history of HTN, GERD, DM, and anxiety/depression presented to ED on 5/24 for weakness and dizziness x 2 days. She reports she started with dizziness and generalized weakness 2 days ago.  Patient describes dizziness as vertigo that increases with position changes and fixing her gaze on an object.  Patient reports associated left ear pain, congestion, and headache that starts in her sinuses and radiates around her head towards occipital region.  Denies syncopal episodes.      CT head was negative for acute intracranial abnormalities. CTA chest was negative for PE. UA significant for UTI.  Home medications reviewed.          Interim Hx:   ..orthostatic dizziness; finally had MRI brain completed this am   .    Review of Systems    Current Outpatient Medications   Medication Instructions    ALPRAZolam (XANAX) 1 mg, Oral, 2 times daily PRN    amLODIPine (NORVASC) 2.5 mg, Oral, Daily    esomeprazole (NEXIUM) 40 mg, Oral, Daily    fluticasone propionate (FLONASE) 50 mcg/actuation nasal spray 1 spray, Each Nostril, 2 times daily    hydrOXYzine (ATARAX)  mg, Oral, Nightly PRN    levothyroxine (SYNTHROID) 50 mcg, Oral, Daily    metFORMIN (GLUCOPHAGE-XR) 500 mg, Oral, Daily    olmesartan (BENICAR) 40 mg, Oral, Daily    propranoloL (INDERAL) 60 mg, Oral, 2 times daily    rosuvastatin (CRESTOR) 20 mg, Oral, Daily    spironolactone (ALDACTONE) 25 mg, Oral, Daily    TRINTELLIX 20 mg Tab 1 tablet, Oral, Daily    URO--10-40.8-36 mg Cap 1 capsule, Oral, Daily PRN  "    Objective:      Exam:   BP (!) 140/82   Pulse 71   Temp 97.5 °F (36.4 °C) (Oral)   Resp 16   Ht 5' 2.01" (1.575 m)   Wt 106.1 kg (234 lb)   SpO2 97%   BMI 42.79 kg/m²   ..alert  CN's ok   ..mother at bedside     Neuroimaging:  .rads: no acute features   My comments: .  Agree     Labs: reviewed         Assessment/Plan:       Active Hospital Problems    Diagnosis    Dizziness   Dysautonomia/orthostatic dizziness    Other comments/ follow up:      Attempted hopeful reassurance   Discussed BP meds with patient and Dr Cruz   She should probably allow her BP's to run somewhat high to avoid dizziness and minimize risk of falling [I rec not be alarmed with pressures of 150-160 systolic in her case]  Prob best to NOT take HCTZ and if swelling an issue prob best to avoid amlodipine   No other specific rec's   Signing off     Kevin R Hargrave, MD MBA [facility] - 9th Floor Med Surg    "

## 2023-05-27 NOTE — PLAN OF CARE
Problem: Adult Inpatient Plan of Care  Goal: Plan of Care Review  Outcome: Ongoing, Progressing  Goal: Patient-Specific Goal (Individualized)  Outcome: Ongoing, Progressing  Goal: Absence of Hospital-Acquired Illness or Injury  Outcome: Ongoing, Progressing  Goal: Optimal Comfort and Wellbeing  Outcome: Ongoing, Progressing  Goal: Readiness for Transition of Care  Outcome: Ongoing, Progressing     Problem: Bariatric Environmental Safety  Goal: Safety Maintained with Care  Outcome: Ongoing, Progressing     Problem: Fall Injury Risk  Goal: Absence of Fall and Fall-Related Injury  Outcome: Ongoing, Progressing     Problem: Anxiety  Goal: Anxiety Reduction or Resolution  Outcome: Ongoing, Not Progressing

## 2023-05-28 ENCOUNTER — HOSPITAL ENCOUNTER (EMERGENCY)
Facility: HOSPITAL | Age: 53
Discharge: LEFT BEFORE DISCHARGE | End: 2023-05-28
Payer: COMMERCIAL

## 2023-05-28 VITALS
DIASTOLIC BLOOD PRESSURE: 98 MMHG | RESPIRATION RATE: 18 BRPM | SYSTOLIC BLOOD PRESSURE: 162 MMHG | HEART RATE: 108 BPM | OXYGEN SATURATION: 98 % | TEMPERATURE: 99 F

## 2023-05-28 VITALS
RESPIRATION RATE: 16 BRPM | DIASTOLIC BLOOD PRESSURE: 83 MMHG | HEIGHT: 62 IN | HEART RATE: 104 BPM | WEIGHT: 234 LBS | TEMPERATURE: 98 F | SYSTOLIC BLOOD PRESSURE: 139 MMHG | BODY MASS INDEX: 43.06 KG/M2 | OXYGEN SATURATION: 97 %

## 2023-05-28 DIAGNOSIS — R07.9 CHEST PAIN, UNSPECIFIED TYPE: Primary | ICD-10-CM

## 2023-05-28 LAB
POCT GLUCOSE: 105 MG/DL (ref 70–110)
POCT GLUCOSE: 60 MG/DL (ref 70–110)

## 2023-05-28 PROCEDURE — 25000003 PHARM REV CODE 250: Performed by: INTERNAL MEDICINE

## 2023-05-28 PROCEDURE — 63600175 PHARM REV CODE 636 W HCPCS: Performed by: INTERNAL MEDICINE

## 2023-05-28 PROCEDURE — 99283 EMERGENCY DEPT VISIT LOW MDM: CPT

## 2023-05-28 PROCEDURE — 97165 OT EVAL LOW COMPLEX 30 MIN: CPT

## 2023-05-28 PROCEDURE — G0378 HOSPITAL OBSERVATION PER HR: HCPCS

## 2023-05-28 PROCEDURE — 96376 TX/PRO/DX INJ SAME DRUG ADON: CPT

## 2023-05-28 PROCEDURE — 25000003 PHARM REV CODE 250: Performed by: STUDENT IN AN ORGANIZED HEALTH CARE EDUCATION/TRAINING PROGRAM

## 2023-05-28 RX ORDER — AMLODIPINE BESYLATE 2.5 MG/1
2.5 TABLET ORAL DAILY
Qty: 30 TABLET | Refills: 0 | Status: SHIPPED | OUTPATIENT
Start: 2023-05-28

## 2023-05-28 RX ORDER — PROPRANOLOL HYDROCHLORIDE 20 MG/1
20 TABLET ORAL 2 TIMES DAILY
Refills: 0
Start: 2023-05-28

## 2023-05-28 RX ORDER — HYDROCHLOROTHIAZIDE 12.5 MG/1
12.5 TABLET ORAL DAILY
Qty: 30 TABLET | Refills: 0 | Status: SHIPPED | OUTPATIENT
Start: 2023-05-29

## 2023-05-28 RX ORDER — AMLODIPINE BESYLATE 2.5 MG/1
5 TABLET ORAL DAILY
Qty: 30 TABLET | Refills: 0 | Status: SHIPPED | OUTPATIENT
Start: 2023-05-28 | End: 2023-05-28 | Stop reason: SDUPTHER

## 2023-05-28 RX ORDER — HYDROCHLOROTHIAZIDE 12.5 MG/1
12.5 TABLET ORAL DAILY
Qty: 30 TABLET | Refills: 11 | Status: CANCELLED | OUTPATIENT
Start: 2023-05-29 | End: 2024-05-28

## 2023-05-28 RX ADMIN — ONDANSETRON 4 MG: 2 INJECTION INTRAMUSCULAR; INTRAVENOUS at 06:05

## 2023-05-28 RX ADMIN — ACETAMINOPHEN 650 MG: 325 TABLET ORAL at 05:05

## 2023-05-28 RX ADMIN — ALPRAZOLAM 1 MG: 1 TABLET, EXTENDED RELEASE ORAL at 09:05

## 2023-05-28 RX ADMIN — PROPRANOLOL HYDROCHLORIDE 20 MG: 20 TABLET ORAL at 09:05

## 2023-05-28 RX ADMIN — PANTOPRAZOLE SODIUM 40 MG: 40 TABLET, DELAYED RELEASE ORAL at 09:05

## 2023-05-28 RX ADMIN — AMLODIPINE BESYLATE 2.5 MG: 2.5 TABLET ORAL at 09:05

## 2023-05-28 RX ADMIN — POLYETHYLENE GLYCOL 3350 17 G: 17 POWDER, FOR SOLUTION ORAL at 09:05

## 2023-05-28 RX ADMIN — LEVOTHYROXINE SODIUM 50 MCG: 50 TABLET ORAL at 09:05

## 2023-05-28 RX ADMIN — HYDROCHLOROTHIAZIDE 12.5 MG: 12.5 TABLET ORAL at 09:05

## 2023-05-28 NOTE — DISCHARGE SUMMARY
[facility] Hospital Medicine Discharge Summary    Admit Date: 5/23/2023  Discharge Date and Time: 5/28/202310:19 AM  Admitting Physician: JULIETTE Team  Discharging Physician: Nesha Cruz DO.  Primary Care Physician: Roberto Araujo MD  Consults: Neurology    Discharge Diagnoses:  Dizziness  Migraine  Generalized weakness   Essential HTN  Anxiety /Depression   DM, type 2  Hypothyroidism   Hyperlipidemia     Hospital Course:   52 y.o. female with a medical history of anxiety/depression, hypertension, gastroesophageal reflux disease and type 2 diabetes mellitus presented with complaint of weakness and dizziness over the past 2 days.    Patient has been at her usual state of health until about 2 days ago when she developed progressive generalized body weakness associated with dizziness and almost passing out.  She denies any fever.  She also admits to sore throat and mild shortness of breaths.  No chest pain.  No fever. She decided to come to the ER further evaluation.    At the ER, CT of the brain shows no acute findings.  CTA of the chest was negative for PE or pneumonia.  Blood pressure has been relatively low. On multiple antihypertensives at home.    MRI results have been reviewed with patient; no acute intracranial abnormalities have been noted.  On day of discharge, patient voices no complaints to me.  Patient is requesting all of her hospital documents; I explained to the patient the hospital policy and that she would need go to medical records for printed out copies; I also explained to the patient she has full access through the portal and can review her documents, she states that she doesnot need to review her medical records but would like a printed copy and notarized; she makes brief mention of a/her  and I explain the process again and let her know that maybe her  could get a copy from medical records sooner but that the most readily available access would be  through the portal; she exhibits no interest in the portal.    I again explained to patient that she can obtain full copy of her medical records through the appropriate department-medical records department, she voiced understanding.    Her father is present at bedside and asked why I cannot print out a full copy of her medical records I  explain to him that there is a department that deals with that, patient states that she knew I would tell her that; I again tell her that all of her medical records are available through the portal.   I asked if she had any more questions or concerns and she voice no other.  A educational copy of orthostatic hypotension has been provided for the patient, she showed very little interest in this information however I told her that this educational information would be available to her and has been part of her discharge packet.    Nurse will be by to remove patient's IV.    Patient is strongly advised to follow-up with her PCP with regard to medication adjustments.  Patient states that she has been taking propanolol since the age of 18 however that medication can certainly be contributing in setting of continuation of other meds--this medication has been reduced and will continue as such.  Hydrochlorothiazide strongly encourage reduction/discontinuation as this could be contributing; will hold and patient to followup with her PCP.    Amlodipine has been increased to 5 mg daily for appropriate control of her BP and propranolol has been reduced to 20 mg b.i.d. instead of 60.      Hospital course and discharge care plan has been discussed with patient, patient voices understanding. All questions have been answered to the best of my ability. Patient is advised to return to ED or call 911 in case of emergency and or if symptoms worsen.    -----another update ------nurse notified me that patient was refusing changes to her medications---patient was to discharge with holding of diuretic in  setting of continued dizziness, increasing amlodipine to 5 mg in setting of diuretic being held in continuation of decreased propranolol at 20 mg b.i.d..    Patient states that she was unaware of this change in needs her propranolol restarted at 60 mg b.i.d., as discussed with patient the recommendation is to continue 20 mg b.i.d as it can certainly be contributing to her dizziness.  She states that she takes propranolol for anxiety but also gets Xanax. I strongly advised that if she does decided, on her own free will to increase back to 60 mg b.i.d. she is strongly advised to monitor her blood pressure and follow up with her PCP. She is also advised oral hydration.    Again patient questioned IV fluids stating that it makes her feel better, patient cannot discharge with IV fluids. Patient is strongly advised oral hydration and again attempted to go over orthostatic hypotension education packet with her but she states she read this information on her own.    Patient states that she needs to take a diuretic in spite of the fact that it could certainly be contributing to her dizziness as discussed by me, my colleague as well as neurologist patient to remains on HCTZ 12.5 mg per her request; recommendations by myself and Neurologist have been discussed with patient again prior to discharge; she is strongly advised to follow up with her PCP.    Amlodipine will be continued at 2.5 mg.    Vitals:  VITAL SIGNS: 24 HRS MIN & MAX LAST   Temp  Min: 97.5 °F (36.4 °C)  Max: 98.3 °F (36.8 °C) 97.9 °F (36.6 °C)   BP  Min: 127/86  Max: 166/95 139/83   Pulse  Min: 71  Max: 104  104   Resp  Min: 16  Max: 16 16   SpO2  Min: 97 %  Max: 98 % 97 %       Physical Exam:  Patient is sitting in the chair onside of the bed.   General: Appears comfortable, no acute distress.  Integumentary: Warm, dry, intact.  Neuro: awake, alert, speech is clear.     Musculoskeletal: Purposeful movement noted.   Respiratory: No accessory muscle use. Breath  sounds are equal.  Cardiovascular: Regular rate.     Procedures Performed: No admission procedures for hospital encounter.     Significant Diagnostic Studies: See Full reports for all details    Recent Labs   Lab 05/23/23  1702   WBC 8.70   RBC 4.22   HGB 13.2   HCT 38.0   MCV 90.0   MCH 31.3*   MCHC 34.7   RDW 12.0      MPV 10.8*       Recent Labs   Lab 05/23/23  1702 05/25/23  1240 05/26/23  1659    133* 134*   K 4.3 4.1 3.8   CO2 21* 22 21*   BUN 13.7 15.0 10.3   CREATININE 0.94 0.96 0.94   CALCIUM 10.0 8.7 9.2   MG 1.80  --  1.80   ALBUMIN 4.0  --   --    ALKPHOS 47  --   --    ALT 25  --   --    AST 24  --   --    BILITOT 0.8  --   --         Microbiology Results (last 7 days)       Procedure Component Value Units Date/Time    Urine Culture High Risk [[id number]]     Order Status: Sent Specimen: Urine, Clean Catch              MRI Brain W WO Contrast  Narrative: EXAMINATION:  MRI BRAIN W WO CONTRAST    CLINICAL HISTORY:  Dizziness, non-specific;    TECHNIQUE:  Multiplanar MRI sequences were performed of the brain without in following administration gadolinium base contrast    COMPARISON:  CT brain May 23, 2023.    FINDINGS:  There are nonspecific minimal periventricular T2 FLAIR hyperintense signals which likely represent minimal chronic microvascular ischemia.  There is no pathologic intra-axial, leptomeningeal or dural enhancement.  Generalized mild cerebral cortical volume loss.  Gradient echo sequences demonstrate no evidence of de phasing artifact to suggest hemorrhagic byproducts. No evidence of diffusion restriction or ADC map signal drop out to suggest acute infarct. The sella and suprasellar areas are unremarkable.    The cerebellar tonsils are normally positioned. There is no acute intracranial hemorrhage, hydrocephalus, midline shift or mass effect. No acute extra axial fluid collections identified. The mastoid air cells are clear.  Bilateral nasal passageways are narrowed due to  hypertrophic rhinitis.  Impression: 1.  No acute intracranial findings identified.    2.  Periventricular minimal T2 FLAIR hyperintense signal suggests minimal chronic microvascular ischemia.    Electronically signed by: Jose Torres  Date:    05/27/2023  Time:    12:17         Medication List        ASK your doctor about these medications      ALPRAZolam 1 MG tablet  Commonly known as: XANAX     amLODIPine 2.5 MG tablet  Commonly known as: NORVASC     esomeprazole 40 MG capsule  Commonly known as: NEXIUM     fluticasone propionate 50 mcg/actuation nasal spray  Commonly known as: FLONASE     hydrOXYzine 50 MG tablet  Commonly known as: ATARAX     levothyroxine 50 MCG tablet  Commonly known as: SYNTHROID     metFORMIN 500 MG ER 24hr tablet  Commonly known as: GLUCOPHAGE-XR     olmesartan 40 MG tablet  Commonly known as: BENICAR     propranoloL 60 MG tablet  Commonly known as: INDERAL     rosuvastatin 20 MG tablet  Commonly known as: CRESTOR     spironolactone 25 MG tablet  Commonly known as: ALDACTONE     TRINTELLIX 20 mg Tab  Generic drug: vortioxetine     URO--10-40.8-36 mg Cap  Generic drug: methen-m.blue-s.phos-phsal-hyo               Explained in detail to the patient about the discharge plan, medications, and follow-up visits.   Discharge Disposition: Home or Self Care   Discharged Condition: stable  Diet-   Dietary Orders (From admission, onward)       Start     Ordered    05/24/23 0623  Diet diabetic  Diet effective now         05/24/23 0622                   Medications Per DC med rec  Activities as tolerated   Follow-up Information       Roberto Araujo MD Follow up.    Specialty: Internal Medicine  Why: Someone from [address] [place] [address] will call you with your appointment information on the next business day    medication adjustments in setting of continue dizziness  Contact information:  [address]  [place] [id number] [phone]                           For further questions contact hospitalist  office     I HAVE SPENT GREATER THAN 90 MINUTES OF PROLONGED CARE TIME IN OVERALL REVIEWING, RE-EVALUATION, DISCUSSING WITH PATIENT, FAMILY, MEDICAL STAFF.     For worsening symptoms or new symptoms----- immediately go to the nearest Emergency Room or call 911 as soon as possible.      Nesha Cr M.D, on 5/28/2023. at 10:19 AM.

## 2023-05-28 NOTE — PT/OT/SLP EVAL
Occupational Therapy   Evaluation and Discharge Note    Name: Sugar Johns  MRN: [id number]  Admitting Diagnosis: Dizziness  Recent Surgery: * No surgery found *      Recommendations:     Discharge Recommendations: home (home with family)  Discharge Equipment Recommendations: none  Barriers to discharge:       Assessment:     Sugar Johns is a 52 y.o. female with a medical diagnosis of Dizziness. Pt continues to complain of dizziness. Orthostatic vitals are as follows:   Supine: 167/103 HR 81; Sittin/97 HR84; Standin/84 HR 92; 1min of standing : 137/86 HR 93. Pt ambulated to bathroom, slow but steady gait with no AD. She states she was able to dress self independently. Strength 5/5. Attempted to assess coordination and vision in semi supine, however this only intensified pts dizziness. No other deficits observed and no LOB to toilet. Checked BP once sitting on toilet 148/100 HR 93. Discussed BP with nrsg following. OT signing off.     Plan:     During this hospitalization, patient does not require further acute OT services.  Please re-consult if situation changes.    Plan of Care Reviewed with: patient    Subjective     Chief Complaint: dizziness      Occupational Profile:  Living Environment: 2 story apt with son, walk in shower   Previous level of function: indep  Roles and Routines: drives   Equipment Used at home: none  Assistance upon Discharge: family     Pain/Comfort:  Pain Rating 1: 0/10    Patients cultural, spiritual, [employer]ism conflicts given the current situation:      Objective:     Communicated with: Patient found  sitting on couch  with   upon OT entry to room.    General Precautions: Standard,    Orthopedic Precautions:    Braces:    Respiratory Status: Room air   Vital Signs: see vitals above     Occupational Performance:    Bed Mobility:    Patient completed Sit to Supine with independence    Functional Mobility/Transfers:  Patient completed Toilet Transfer Step Transfer  technique with independence with  no AD  Functional Mobility: no lob; slow but steady gait     Activities of Daily Living:  Pt reports she dressed self independently ; fully dressed upon arrival; toilet t/f with independence     Cognitive/Visual Perceptual:  Visual/Perceptual:      -unable to accurately assess 2/2 increased dizziness with testing      Physical Exam:  Upper Extremity Strength:    -       Right Upper Extremity: WFL  -       Left Upper Extremity: WFL      AMPAC 6 Click ADL:  AMPA Total Score:      Therapeutic Positioning  Risk for acquired pressure injuries is decreased due to ability to mobilize independently .    No pressure related skin breakdown noted at this time       Patient Education:  Patient provided with verbal education regarding OT role/goals/POC.  Understanding was verbalized.     Patient left HOB elevated with all lines intact, call button in reach, nrsg notified, and family present    GOALS:   Multidisciplinary Problems       Occupational Therapy Goals       Not on file                    History:     Past Medical History:   Diagnosis Date    Anxiety disorder, unspecified     Depression     Diabetes mellitus     GERD (gastroesophageal reflux disease)     Hypertension          Past Surgical History:   Procedure Laterality Date    HYSTERECTOMY         Time Tracking:     OT Date of Treatment:    OT Start Time: 1055  OT Stop Time: 1111  OT Total Time (min): 16 min    Billable Minutes:Evaluation Low Complexity     5/28/2023

## 2023-05-28 NOTE — PROGRESS NOTES
[facility]  Hospital Medicine Progress Note      Chief Complaint: dizziness and headache     HPI:   52 y.o. female with a medical history of anxiety/depression, hypertension, gastroesophageal reflux disease and type 2 diabetes mellitus presented with complaint of weakness and dizziness over the past 2 days.    Patient has been at her usual state of health until about 2 days ago when she developed progressive generalized body weakness associated with dizziness and almost passing out.  She denies any fever.  She also admits to sore throat and mild shortness of breaths.  No chest pain.  No fever. She decided to come to the ER further evaluation.    At the ER, CT of the brain shows no acute findings.  CTA of the chest was negative for PE or pneumonia.  Blood pressure has been relatively low. On multiple antihypertensives at home.      Interval Hx:   My initial encounter with patient, she states that she has had dizziness for several months now but she comes into the ER complaining of worsening dizziness and feeling like she was about to pass out but did not pass out.    She states that she has seen the ENT in the past but not for this reason.    She states that she also has vertigo and takes meclizine for this.  She states that her meclizine does help with her vertigo but this is not vertigo.  She states that her dizziness comes suddenly and nothing seems to make it better.    She was seen by 1 of my colleagues on yesterday and states that that had been some discrepancy in management from neurologist versus hospitalist and that she her medications were slowly adjusted during this hospital stay.  She states that the cocktail that was given to her by neurologist did indeed help with her nausea and headache but that her dizziness remains.    She states that IV fluids do help with her dizziness.    IV fluids have been discontinued and she is asking why, I explain to her that we encourage oral  hydration in setting where IV hydration is not a requirement as she cannot go home with IV hydration.   Also discussed BP medications and as documented patient is requesting HCTZ instead of spirinolactone with the understanding that these medications could be contributing to her dizziness.     Objective/physical exam:  General: Appears comfortable, no acute distress.  Integumentary: Warm, dry, intact.    Musculoskeletal: Purposeful movement noted.   Respiratory: No accessory muscle use. Breath sounds are equal.  Cardiovascular: Regular rate. No peripheral edema.    VITAL SIGNS: 24 HRS MIN & MAX LAST   Temp  Min: 97.5 °F (36.4 °C)  Max: 98.3 °F (36.8 °C) 97.9 °F (36.6 °C)   BP  Min: 127/86  Max: 166/95 139/83   Pulse  Min: 71  Max: 104  104   Resp  Min: 16  Max: 16 16   SpO2  Min: 97 %  Max: 98 % 97 %     MRI Brain W WO Contrast  Narrative: EXAMINATION:  MRI BRAIN W WO CONTRAST    CLINICAL HISTORY:  Dizziness, non-specific;    TECHNIQUE:  Multiplanar MRI sequences were performed of the brain without in following administration gadolinium base contrast    COMPARISON:  CT brain May 23, 2023.    FINDINGS:  There are nonspecific minimal periventricular T2 FLAIR hyperintense signals which likely represent minimal chronic microvascular ischemia.  There is no pathologic intra-axial, leptomeningeal or dural enhancement.  Generalized mild cerebral cortical volume loss.  Gradient echo sequences demonstrate no evidence of de phasing artifact to suggest hemorrhagic byproducts. No evidence of diffusion restriction or ADC map signal drop out to suggest acute infarct. The sella and suprasellar areas are unremarkable.    The cerebellar tonsils are normally positioned. There is no acute intracranial hemorrhage, hydrocephalus, midline shift or mass effect. No acute extra axial fluid collections identified. The mastoid air cells are clear.  Bilateral nasal passageways are narrowed due to hypertrophic rhinitis.  Impression: 1.  No acute  intracranial findings identified.    2.  Periventricular minimal T2 FLAIR hyperintense signal suggests minimal chronic microvascular ischemia.    Electronically signed by: Jose Torres  Date:    05/27/2023  Time:    12:17    Recent Labs   Lab 05/23/23  1702   WBC 8.70   RBC 4.22   HGB 13.2   HCT 38.0   MCV 90.0   MCH 31.3*   MCHC 34.7   RDW 12.0      MPV 10.8*       Recent Labs   Lab 05/23/23  1702 05/25/23  1240 05/26/23  1659    133* 134*   K 4.3 4.1 3.8   CO2 21* 22 21*   BUN 13.7 15.0 10.3   CREATININE 0.94 0.96 0.94   CALCIUM 10.0 8.7 9.2   MG 1.80  --  1.80   ALBUMIN 4.0  --   --    ALKPHOS 47  --   --    ALT 25  --   --    AST 24  --   --    BILITOT 0.8  --   --           Microbiology Results (last 7 days)       Procedure Component Value Units Date/Time    Urine Culture High Risk [[id number]]     Order Status: Sent Specimen: Urine, Clean Catch              See below for Radiology    Scheduled Med:   ALPRAZolam  1 mg Oral Q12H    amLODIPine  2.5 mg Oral Daily    cetirizine  10 mg Oral QHS    enoxparin  40 mg Subcutaneous Q24H (prophylaxis, 1700)    fluticasone propionate  1 spray Each Nostril BID    hydroCHLOROthiazide  12.5 mg Oral Daily    levothyroxine  50 mcg Oral Daily    pantoprazole  40 mg Oral Daily    polyethylene glycol  17 g Oral Daily    propranoloL  20 mg Oral BID    rosuvastatin  20 mg Oral QHS    vortioxetine  1 tablet Oral QHS        Continuous Infusions:       PRN Meds:  acetaminophen, dextrose 10%, dextrose 10%, dextrose 50%, docusate sodium, glucagon (human recombinant), glucose, glucose, hydrOXYzine, insulin aspart U-100, lorazepam, ondansetron, promethazine     Nutrition Status:      Assessment/Plan:  Dizziness  Migraine  Generalized weakness   Essential HTN  Anxiety /Depression   DM, type 2  Hypothyroidism   Hyperlipidemia   -----------------------------------------------------------------------------------  Patient presents for dizziness, orthostatics were positive on  admission on 05/23/2023, patient was given IV fluid hydration but that has since been discontinued.    Will follow-up on patient's labs and vitals if no indication for IV hydration I will not restart at this time.  Strongly encourage oral hydration.    Patient's medications can certainly be contributing, will review.  Patient's labs have been reviewed and are significantly unremarkable, patient is without anemia on admission.    Patient was slightly hypotensive thus will repeat labs today in follow-up for need of IV hydration.      Blood glucose borderline low and states that this is her normal BG at home, will continue to monitor and treat if <60 and/or worsening symptoms.     MRI pending    Consults neurology    Anticipated discharge and Disposition:  Pending.    All diagnosis and differential diagnosis have been reviewed,  interpreted and communicated appropriately to care team. assessment and plan has been documented; I have personally reviewed the labs and test results that are presently available and pertinent to this hospital course; I have reviewed medical records based upon their availability.    I will continue to monitor closely and make adjustments to medical management as needed.    Nesha Cruz, DO    Document for 5/26/23       This note was created with the assistance of Dragon voice recognition software. There may be transcription errors as a result of using this technology however minimal. Effort has been made to assure accuracy of transcription but any obvious errors or omissions should be clarified with the author of the document.

## 2024-01-17 DIAGNOSIS — K21.9 GASTROESOPHAGEAL REFLUX DISEASE WITHOUT ESOPHAGITIS: Primary | ICD-10-CM

## 2024-03-22 ENCOUNTER — HOSPITAL ENCOUNTER (OUTPATIENT)
Dept: RADIOLOGY | Facility: HOSPITAL | Age: 54
Discharge: HOME | End: 2024-03-22
Attending: INTERNAL MEDICINE
Payer: COMMERCIAL

## 2024-03-22 DIAGNOSIS — K21.9 GASTROESOPHAGEAL REFLUX DISEASE WITHOUT ESOPHAGITIS: ICD-10-CM

## 2024-03-22 PROCEDURE — A9698 NON-RAD CONTRAST MATERIALNOC: HCPCS | Performed by: INTERNAL MEDICINE

## 2024-03-22 PROCEDURE — 74220 X-RAY XM ESOPHAGUS 1CNTRST: CPT | Mod: TC

## 2024-03-22 PROCEDURE — 25500020 PHARM REV CODE 255: Performed by: INTERNAL MEDICINE

## 2024-03-22 RX ADMIN — BARIUM SULFATE 40 ML: 0.6 SUSPENSION ORAL at 08:03

## 2024-03-22 RX ADMIN — BARIUM SULFATE 40 ML: 980 POWDER, FOR SUSPENSION ORAL at 08:03
